# Patient Record
Sex: MALE | Race: WHITE | Employment: OTHER | URBAN - METROPOLITAN AREA
[De-identification: names, ages, dates, MRNs, and addresses within clinical notes are randomized per-mention and may not be internally consistent; named-entity substitution may affect disease eponyms.]

---

## 2017-03-21 PROBLEM — F33.41 RECURRENT MAJOR DEPRESSIVE DISORDER, IN PARTIAL REMISSION (HCC): Status: ACTIVE | Noted: 2017-03-21

## 2017-07-07 ENCOUNTER — HOSPITAL ENCOUNTER (OUTPATIENT)
Age: 82
Setting detail: SPECIMEN
Discharge: HOME | End: 2017-07-07
Payer: COMMERCIAL

## 2017-07-07 LAB
ALBUMIN SERPL-MCNC: 4.1 G/DL (ref 3.5–5.2)
ALBUMIN/GLOBULIN RATIO: 1.1 (ref 1–2.5)
ALP BLD-CCNC: 81 U/L (ref 40–129)
ALT SERPL-CCNC: 23 U/L (ref 5–41)
AST SERPL-CCNC: 23 U/L
BILIRUB SERPL-MCNC: 0.27 MG/DL (ref 0.3–1.2)
BILIRUBIN DIRECT: <0.08 MG/DL
BILIRUBIN, INDIRECT: ABNORMAL MG/DL (ref 0–1)
CHOLESTEROL/HDL RATIO: 3.4
CHOLESTEROL: 212 MG/DL
GLOBULIN: ABNORMAL G/DL (ref 1.5–3.8)
HDLC SERPL-MCNC: 62 MG/DL
LDL CHOLESTEROL: 121 MG/DL (ref 0–130)
TOTAL PROTEIN: 7.8 G/DL (ref 6.4–8.3)
TRIGL SERPL-MCNC: 146 MG/DL
VLDLC SERPL CALC-MCNC: ABNORMAL MG/DL (ref 1–30)

## 2017-07-07 PROCEDURE — 36415 COLL VENOUS BLD VENIPUNCTURE: CPT

## 2017-07-07 PROCEDURE — 80076 HEPATIC FUNCTION PANEL: CPT

## 2017-07-07 PROCEDURE — 80061 LIPID PANEL: CPT

## 2017-07-07 PROCEDURE — P9603 ONE-WAY ALLOW PRORATED MILES: HCPCS

## 2017-08-18 ENCOUNTER — HOSPITAL ENCOUNTER (OUTPATIENT)
Age: 82
Setting detail: SPECIMEN
Discharge: HOME | End: 2017-08-18
Payer: COMMERCIAL

## 2017-08-18 LAB
ABSOLUTE EOS #: 0.3 K/UL (ref 0–0.4)
ABSOLUTE LYMPH #: 1.3 K/UL (ref 1–4.8)
ABSOLUTE MONO #: 0.5 K/UL (ref 0.1–1.2)
ANION GAP SERPL CALCULATED.3IONS-SCNC: 14 MMOL/L (ref 9–17)
BASOPHILS # BLD: 0 %
BASOPHILS ABSOLUTE: 0 K/UL (ref 0–0.2)
BUN BLDV-MCNC: 19 MG/DL (ref 8–23)
BUN/CREAT BLD: ABNORMAL (ref 9–20)
CALCIUM SERPL-MCNC: 9.1 MG/DL (ref 8.6–10.4)
CHLORIDE BLD-SCNC: 100 MMOL/L (ref 98–107)
CHOLESTEROL/HDL RATIO: 3.4
CHOLESTEROL: 183 MG/DL
CO2: 22 MMOL/L (ref 20–31)
CREAT SERPL-MCNC: 1.27 MG/DL (ref 0.7–1.2)
DIFFERENTIAL TYPE: ABNORMAL
EOSINOPHILS RELATIVE PERCENT: 3 %
GFR AFRICAN AMERICAN: >60 ML/MIN
GFR NON-AFRICAN AMERICAN: 54 ML/MIN
GFR SERPL CREATININE-BSD FRML MDRD: ABNORMAL ML/MIN/{1.73_M2}
GFR SERPL CREATININE-BSD FRML MDRD: ABNORMAL ML/MIN/{1.73_M2}
GLUCOSE BLD-MCNC: 89 MG/DL (ref 70–99)
HCT VFR BLD CALC: 39.5 % (ref 41–53)
HDLC SERPL-MCNC: 54 MG/DL
HEMOGLOBIN: 13.1 G/DL (ref 13.5–17.5)
LDL CHOLESTEROL: 102 MG/DL (ref 0–130)
LYMPHOCYTES # BLD: 18 %
MCH RBC QN AUTO: 29.2 PG (ref 26–34)
MCHC RBC AUTO-ENTMCNC: 33.3 G/DL (ref 31–37)
MCV RBC AUTO: 87.6 FL (ref 80–100)
MONOCYTES # BLD: 7 %
PDW BLD-RTO: 14.5 % (ref 12.5–15.4)
PLATELET # BLD: 219 K/UL (ref 140–450)
PLATELET ESTIMATE: ABNORMAL
PMV BLD AUTO: 8.9 FL (ref 6–12)
POTASSIUM SERPL-SCNC: 4.2 MMOL/L (ref 3.7–5.3)
RBC # BLD: 4.5 M/UL (ref 4.5–5.9)
RBC # BLD: ABNORMAL 10*6/UL
SEG NEUTROPHILS: 72 %
SEGMENTED NEUTROPHILS ABSOLUTE COUNT: 5.3 K/UL (ref 1.8–7.7)
SODIUM BLD-SCNC: 136 MMOL/L (ref 135–144)
TRIGL SERPL-MCNC: 134 MG/DL
VITAMIN D 25-HYDROXY: 26.2 NG/ML (ref 30–100)
VLDLC SERPL CALC-MCNC: NORMAL MG/DL (ref 1–30)
WBC # BLD: 7.4 K/UL (ref 3.5–11)
WBC # BLD: ABNORMAL 10*3/UL

## 2017-08-18 PROCEDURE — 82306 VITAMIN D 25 HYDROXY: CPT

## 2017-08-18 PROCEDURE — 36415 COLL VENOUS BLD VENIPUNCTURE: CPT

## 2017-08-18 PROCEDURE — 80048 BASIC METABOLIC PNL TOTAL CA: CPT

## 2017-08-18 PROCEDURE — P9603 ONE-WAY ALLOW PRORATED MILES: HCPCS

## 2017-08-18 PROCEDURE — 80061 LIPID PANEL: CPT

## 2017-08-18 PROCEDURE — 85025 COMPLETE CBC W/AUTO DIFF WBC: CPT

## 2017-09-07 ENCOUNTER — HOSPITAL ENCOUNTER (OUTPATIENT)
Age: 82
Setting detail: SPECIMEN
Discharge: HOME | End: 2017-09-07
Payer: COMMERCIAL

## 2017-09-07 LAB
ANION GAP SERPL CALCULATED.3IONS-SCNC: 16 MMOL/L (ref 9–17)
BUN BLDV-MCNC: 23 MG/DL (ref 8–23)
BUN/CREAT BLD: ABNORMAL (ref 9–20)
CALCIUM SERPL-MCNC: 9.3 MG/DL (ref 8.6–10.4)
CHLORIDE BLD-SCNC: 98 MMOL/L (ref 98–107)
CO2: 25 MMOL/L (ref 20–31)
CREAT SERPL-MCNC: 1.45 MG/DL (ref 0.7–1.2)
GFR AFRICAN AMERICAN: 56 ML/MIN
GFR NON-AFRICAN AMERICAN: 46 ML/MIN
GFR SERPL CREATININE-BSD FRML MDRD: ABNORMAL ML/MIN/{1.73_M2}
GFR SERPL CREATININE-BSD FRML MDRD: ABNORMAL ML/MIN/{1.73_M2}
GLUCOSE BLD-MCNC: 126 MG/DL (ref 70–99)
HCT VFR BLD CALC: 43.6 % (ref 41–53)
HEMOGLOBIN: 14.6 G/DL (ref 13.5–17.5)
MCH RBC QN AUTO: 28.7 PG (ref 26–34)
MCHC RBC AUTO-ENTMCNC: 33.5 G/DL (ref 31–37)
MCV RBC AUTO: 85.8 FL (ref 80–100)
PDW BLD-RTO: 14.2 % (ref 12.5–15.4)
PLATELET # BLD: 249 K/UL (ref 140–450)
PMV BLD AUTO: 9.1 FL (ref 6–12)
POTASSIUM SERPL-SCNC: 4.1 MMOL/L (ref 3.7–5.3)
RBC # BLD: 5.08 M/UL (ref 4.5–5.9)
SODIUM BLD-SCNC: 139 MMOL/L (ref 135–144)
WBC # BLD: 8.8 K/UL (ref 3.5–11)

## 2017-09-07 PROCEDURE — P9603 ONE-WAY ALLOW PRORATED MILES: HCPCS

## 2017-09-07 PROCEDURE — 36415 COLL VENOUS BLD VENIPUNCTURE: CPT

## 2017-09-07 PROCEDURE — 80048 BASIC METABOLIC PNL TOTAL CA: CPT

## 2017-09-07 PROCEDURE — 85027 COMPLETE CBC AUTOMATED: CPT

## 2017-11-06 ENCOUNTER — HOSPITAL ENCOUNTER (OUTPATIENT)
Age: 82
Setting detail: SPECIMEN
Discharge: HOME | End: 2017-11-06
Payer: MEDICAID

## 2017-11-06 LAB
ANION GAP SERPL CALCULATED.3IONS-SCNC: 19 MMOL/L (ref 9–17)
BUN BLDV-MCNC: 14 MG/DL (ref 8–23)
BUN/CREAT BLD: ABNORMAL (ref 9–20)
CALCIUM SERPL-MCNC: 9.3 MG/DL (ref 8.6–10.4)
CHLORIDE BLD-SCNC: 101 MMOL/L (ref 98–107)
CO2: 21 MMOL/L (ref 20–31)
CREAT SERPL-MCNC: 1.36 MG/DL (ref 0.7–1.2)
GFR AFRICAN AMERICAN: >60 ML/MIN
GFR NON-AFRICAN AMERICAN: 50 ML/MIN
GFR SERPL CREATININE-BSD FRML MDRD: ABNORMAL ML/MIN/{1.73_M2}
GFR SERPL CREATININE-BSD FRML MDRD: ABNORMAL ML/MIN/{1.73_M2}
GLUCOSE BLD-MCNC: 155 MG/DL (ref 70–99)
HCT VFR BLD CALC: 45.9 % (ref 40.7–50.3)
HEMOGLOBIN: 14.1 G/DL (ref 13–17)
MCH RBC QN AUTO: 27.4 PG (ref 25.2–33.5)
MCHC RBC AUTO-ENTMCNC: 30.7 G/DL (ref 28.4–34.8)
MCV RBC AUTO: 89.1 FL (ref 82.6–102.9)
PDW BLD-RTO: 14.2 % (ref 11.8–14.4)
PLATELET # BLD: 266 K/UL (ref 138–453)
PMV BLD AUTO: 11.2 FL (ref 8.1–13.5)
POTASSIUM SERPL-SCNC: 4.4 MMOL/L (ref 3.7–5.3)
RBC # BLD: 5.15 M/UL (ref 4.21–5.77)
SODIUM BLD-SCNC: 141 MMOL/L (ref 135–144)
WBC # BLD: 7.2 K/UL (ref 3.5–11.3)

## 2017-11-06 PROCEDURE — 36415 COLL VENOUS BLD VENIPUNCTURE: CPT

## 2017-11-06 PROCEDURE — P9603 ONE-WAY ALLOW PRORATED MILES: HCPCS

## 2017-11-06 PROCEDURE — 85027 COMPLETE CBC AUTOMATED: CPT

## 2017-11-06 PROCEDURE — 80048 BASIC METABOLIC PNL TOTAL CA: CPT

## 2018-01-26 ENCOUNTER — HOSPITAL ENCOUNTER (OUTPATIENT)
Age: 83
Setting detail: SPECIMEN
Discharge: HOME | End: 2018-01-26
Payer: MEDICAID

## 2018-01-26 LAB
-: ABNORMAL
AMORPHOUS: ABNORMAL
BACTERIA: ABNORMAL
BILIRUBIN URINE: NEGATIVE
CASTS UA: ABNORMAL /LPF (ref 0–8)
COLOR: ABNORMAL
COMMENT UA: ABNORMAL
CRYSTALS, UA: ABNORMAL /HPF
EPITHELIAL CELLS UA: ABNORMAL /HPF (ref 0–5)
GLUCOSE URINE: NEGATIVE
KETONES, URINE: NEGATIVE
LEUKOCYTE ESTERASE, URINE: ABNORMAL
MUCUS: ABNORMAL
NITRITE, URINE: NEGATIVE
OTHER OBSERVATIONS UA: ABNORMAL
PH UA: 8.5 (ref 5–8)
PROTEIN UA: ABNORMAL
RBC UA: ABNORMAL /HPF (ref 0–4)
RENAL EPITHELIAL, UA: ABNORMAL /HPF
SPECIFIC GRAVITY UA: 1.01 (ref 1–1.03)
TRICHOMONAS: ABNORMAL
TURBIDITY: ABNORMAL
URINE HGB: ABNORMAL
UROBILINOGEN, URINE: NORMAL
WBC UA: ABNORMAL /HPF (ref 0–5)
YEAST: ABNORMAL

## 2018-01-26 PROCEDURE — 87086 URINE CULTURE/COLONY COUNT: CPT

## 2018-01-26 PROCEDURE — 81001 URINALYSIS AUTO W/SCOPE: CPT

## 2018-01-26 PROCEDURE — 87088 URINE BACTERIA CULTURE: CPT

## 2018-01-26 PROCEDURE — 87186 SC STD MICRODIL/AGAR DIL: CPT

## 2018-01-27 LAB
CULTURE: ABNORMAL
CULTURE: ABNORMAL
Lab: ABNORMAL
Lab: ABNORMAL
ORGANISM: ABNORMAL
SPECIMEN DESCRIPTION: ABNORMAL
SPECIMEN DESCRIPTION: ABNORMAL
STATUS: ABNORMAL

## 2018-02-27 ENCOUNTER — HOSPITAL ENCOUNTER (OUTPATIENT)
Age: 83
Setting detail: SPECIMEN
Discharge: HOME | End: 2018-02-27
Payer: MEDICAID

## 2018-02-27 LAB
ABSOLUTE EOS #: 0.33 K/UL (ref 0–0.44)
ABSOLUTE IMMATURE GRANULOCYTE: <0.03 K/UL (ref 0–0.3)
ABSOLUTE LYMPH #: 1.44 K/UL (ref 1.1–3.7)
ABSOLUTE MONO #: 0.46 K/UL (ref 0.1–1.2)
ANION GAP SERPL CALCULATED.3IONS-SCNC: 14 MMOL/L (ref 9–17)
BASOPHILS # BLD: 1 % (ref 0–2)
BASOPHILS ABSOLUTE: 0.04 K/UL (ref 0–0.2)
BUN BLDV-MCNC: 27 MG/DL (ref 8–23)
BUN/CREAT BLD: ABNORMAL (ref 9–20)
CALCIUM SERPL-MCNC: 9.2 MG/DL (ref 8.6–10.4)
CHLORIDE BLD-SCNC: 95 MMOL/L (ref 98–107)
CO2: 24 MMOL/L (ref 20–31)
CREAT SERPL-MCNC: 1.42 MG/DL (ref 0.7–1.2)
DIFFERENTIAL TYPE: ABNORMAL
EOSINOPHILS RELATIVE PERCENT: 6 % (ref 1–4)
GFR AFRICAN AMERICAN: 58 ML/MIN
GFR NON-AFRICAN AMERICAN: 48 ML/MIN
GFR SERPL CREATININE-BSD FRML MDRD: ABNORMAL ML/MIN/{1.73_M2}
GFR SERPL CREATININE-BSD FRML MDRD: ABNORMAL ML/MIN/{1.73_M2}
GLUCOSE BLD-MCNC: 94 MG/DL (ref 70–99)
HCT VFR BLD CALC: 42.4 % (ref 40.7–50.3)
HEMOGLOBIN: 13.3 G/DL (ref 13–17)
IMMATURE GRANULOCYTES: 0 %
LYMPHOCYTES # BLD: 25 % (ref 24–43)
MAGNESIUM: 2.3 MG/DL (ref 1.6–2.6)
MCH RBC QN AUTO: 28.2 PG (ref 25.2–33.5)
MCHC RBC AUTO-ENTMCNC: 31.4 G/DL (ref 28.4–34.8)
MCV RBC AUTO: 89.8 FL (ref 82.6–102.9)
MONOCYTES # BLD: 8 % (ref 3–12)
NRBC AUTOMATED: 0 PER 100 WBC
PDW BLD-RTO: 13.1 % (ref 11.8–14.4)
PHOSPHORUS: 3.3 MG/DL (ref 2.5–4.5)
PLATELET # BLD: 224 K/UL (ref 138–453)
PLATELET ESTIMATE: ABNORMAL
PMV BLD AUTO: 11.5 FL (ref 8.1–13.5)
POTASSIUM SERPL-SCNC: 4.3 MMOL/L (ref 3.7–5.3)
RBC # BLD: 4.72 M/UL (ref 4.21–5.77)
RBC # BLD: ABNORMAL 10*6/UL
SEG NEUTROPHILS: 60 % (ref 36–65)
SEGMENTED NEUTROPHILS ABSOLUTE COUNT: 3.4 K/UL (ref 1.5–8.1)
SODIUM BLD-SCNC: 133 MMOL/L (ref 135–144)
VITAMIN D 25-HYDROXY: 30.3 NG/ML (ref 30–100)
WBC # BLD: 5.7 K/UL (ref 3.5–11.3)
WBC # BLD: ABNORMAL 10*3/UL

## 2018-02-27 PROCEDURE — 85025 COMPLETE CBC W/AUTO DIFF WBC: CPT

## 2018-02-27 PROCEDURE — 83735 ASSAY OF MAGNESIUM: CPT

## 2018-02-27 PROCEDURE — P9603 ONE-WAY ALLOW PRORATED MILES: HCPCS

## 2018-02-27 PROCEDURE — 36415 COLL VENOUS BLD VENIPUNCTURE: CPT

## 2018-02-27 PROCEDURE — 82306 VITAMIN D 25 HYDROXY: CPT

## 2018-02-27 PROCEDURE — 80048 BASIC METABOLIC PNL TOTAL CA: CPT

## 2018-02-27 PROCEDURE — 84100 ASSAY OF PHOSPHORUS: CPT

## 2018-03-14 ENCOUNTER — HOSPITAL ENCOUNTER (OUTPATIENT)
Age: 83
Setting detail: SPECIMEN
Discharge: HOME | End: 2018-03-14
Payer: MEDICAID

## 2018-03-14 PROCEDURE — 87086 URINE CULTURE/COLONY COUNT: CPT

## 2018-03-15 ENCOUNTER — HOSPITAL ENCOUNTER (OUTPATIENT)
Age: 83
Setting detail: SPECIMEN
Discharge: HOME | End: 2018-03-15
Payer: MEDICAID

## 2018-03-16 LAB
CULTURE: NORMAL
CULTURE: NORMAL
Lab: NORMAL
Lab: NORMAL
SPECIMEN DESCRIPTION: NORMAL
SPECIMEN DESCRIPTION: NORMAL
STATUS: NORMAL

## 2018-05-29 ENCOUNTER — HOSPITAL ENCOUNTER (OUTPATIENT)
Age: 83
Setting detail: SPECIMEN
Discharge: HOME | End: 2018-05-29
Payer: COMMERCIAL

## 2018-05-29 LAB
ANION GAP SERPL CALCULATED.3IONS-SCNC: 18 MMOL/L (ref 9–17)
BUN BLDV-MCNC: 28 MG/DL (ref 8–23)
BUN/CREAT BLD: ABNORMAL (ref 9–20)
CALCIUM SERPL-MCNC: 9.1 MG/DL (ref 8.6–10.4)
CHLORIDE BLD-SCNC: 97 MMOL/L (ref 98–107)
CO2: 24 MMOL/L (ref 20–31)
CREAT SERPL-MCNC: 1.43 MG/DL (ref 0.7–1.2)
GFR AFRICAN AMERICAN: 57 ML/MIN
GFR NON-AFRICAN AMERICAN: 47 ML/MIN
GFR SERPL CREATININE-BSD FRML MDRD: ABNORMAL ML/MIN/{1.73_M2}
GFR SERPL CREATININE-BSD FRML MDRD: ABNORMAL ML/MIN/{1.73_M2}
GLUCOSE BLD-MCNC: 97 MG/DL (ref 70–99)
HCT VFR BLD CALC: 44.1 % (ref 40.7–50.3)
HEMOGLOBIN: 13.7 G/DL (ref 13–17)
MCH RBC QN AUTO: 28.2 PG (ref 25.2–33.5)
MCHC RBC AUTO-ENTMCNC: 31.1 G/DL (ref 28.4–34.8)
MCV RBC AUTO: 90.9 FL (ref 82.6–102.9)
NRBC AUTOMATED: 0 PER 100 WBC
PDW BLD-RTO: 13.6 % (ref 11.8–14.4)
PLATELET # BLD: 238 K/UL (ref 138–453)
PMV BLD AUTO: 11.5 FL (ref 8.1–13.5)
POTASSIUM SERPL-SCNC: 4 MMOL/L (ref 3.7–5.3)
RBC # BLD: 4.85 M/UL (ref 4.21–5.77)
SODIUM BLD-SCNC: 139 MMOL/L (ref 135–144)
WBC # BLD: 7.2 K/UL (ref 3.5–11.3)

## 2018-05-29 PROCEDURE — 85027 COMPLETE CBC AUTOMATED: CPT

## 2018-05-29 PROCEDURE — 80048 BASIC METABOLIC PNL TOTAL CA: CPT

## 2018-05-29 PROCEDURE — P9603 ONE-WAY ALLOW PRORATED MILES: HCPCS

## 2018-05-29 PROCEDURE — 36415 COLL VENOUS BLD VENIPUNCTURE: CPT

## 2019-05-20 ENCOUNTER — APPOINTMENT (OUTPATIENT)
Dept: CT IMAGING | Age: 84
End: 2019-05-20
Payer: COMMERCIAL

## 2019-05-20 ENCOUNTER — HOSPITAL ENCOUNTER (EMERGENCY)
Age: 84
Discharge: HOME | End: 2019-05-20
Attending: EMERGENCY MEDICINE
Payer: COMMERCIAL

## 2019-05-20 VITALS
SYSTOLIC BLOOD PRESSURE: 108 MMHG | TEMPERATURE: 98.6 F | DIASTOLIC BLOOD PRESSURE: 57 MMHG | OXYGEN SATURATION: 100 % | HEIGHT: 66 IN | HEART RATE: 88 BPM | WEIGHT: 233 LBS | RESPIRATION RATE: 16 BRPM | BODY MASS INDEX: 37.45 KG/M2

## 2019-05-20 DIAGNOSIS — G89.29 ACUTE EXACERBATION OF CHRONIC LOW BACK PAIN: Primary | ICD-10-CM

## 2019-05-20 DIAGNOSIS — M54.50 ACUTE EXACERBATION OF CHRONIC LOW BACK PAIN: Primary | ICD-10-CM

## 2019-05-20 LAB
-: ABNORMAL
ABSOLUTE EOS #: 0.3 K/UL (ref 0–0.4)
ABSOLUTE IMMATURE GRANULOCYTE: ABNORMAL K/UL (ref 0–0.3)
ABSOLUTE LYMPH #: 0.9 K/UL (ref 1–4.8)
ABSOLUTE MONO #: 0.5 K/UL (ref 0.1–1.2)
ALBUMIN SERPL-MCNC: 3.5 G/DL (ref 3.5–5.2)
ALBUMIN/GLOBULIN RATIO: 1 (ref 1–2.5)
ALP BLD-CCNC: 62 U/L (ref 40–129)
ALT SERPL-CCNC: 27 U/L (ref 5–41)
AMORPHOUS: ABNORMAL
ANION GAP SERPL CALCULATED.3IONS-SCNC: 13 MMOL/L (ref 9–17)
AST SERPL-CCNC: 20 U/L
BACTERIA: ABNORMAL
BASOPHILS # BLD: 1 % (ref 0–2)
BASOPHILS ABSOLUTE: 0 K/UL (ref 0–0.2)
BILIRUB SERPL-MCNC: 0.36 MG/DL (ref 0.3–1.2)
BILIRUBIN URINE: NEGATIVE
BUN BLDV-MCNC: 16 MG/DL (ref 8–23)
BUN/CREAT BLD: ABNORMAL (ref 9–20)
CALCIUM SERPL-MCNC: 9.5 MG/DL (ref 8.6–10.4)
CASTS UA: ABNORMAL /LPF
CASTS UA: ABNORMAL /LPF
CHLORIDE BLD-SCNC: 101 MMOL/L (ref 98–107)
CO2: 25 MMOL/L (ref 20–31)
COLOR: ABNORMAL
COMMENT UA: ABNORMAL
CREAT SERPL-MCNC: 1.53 MG/DL (ref 0.7–1.2)
CRYSTALS, UA: ABNORMAL /HPF
DIFFERENTIAL TYPE: ABNORMAL
EOSINOPHILS RELATIVE PERCENT: 4 % (ref 1–4)
EPITHELIAL CELLS UA: ABNORMAL /HPF (ref 0–5)
GFR AFRICAN AMERICAN: 53 ML/MIN
GFR NON-AFRICAN AMERICAN: 43 ML/MIN
GFR SERPL CREATININE-BSD FRML MDRD: ABNORMAL ML/MIN/{1.73_M2}
GFR SERPL CREATININE-BSD FRML MDRD: ABNORMAL ML/MIN/{1.73_M2}
GLUCOSE BLD-MCNC: 179 MG/DL (ref 70–99)
GLUCOSE URINE: NEGATIVE
HCT VFR BLD CALC: 39.1 % (ref 41–53)
HEMOGLOBIN: 12.9 G/DL (ref 13.5–17.5)
IMMATURE GRANULOCYTES: ABNORMAL %
KETONES, URINE: NEGATIVE
LEUKOCYTE ESTERASE, URINE: NEGATIVE
LYMPHOCYTES # BLD: 13 % (ref 24–44)
MCH RBC QN AUTO: 29 PG (ref 26–34)
MCHC RBC AUTO-ENTMCNC: 33 G/DL (ref 31–37)
MCV RBC AUTO: 87.8 FL (ref 80–100)
MONOCYTES # BLD: 8 % (ref 2–11)
MUCUS: ABNORMAL
NITRITE, URINE: NEGATIVE
NRBC AUTOMATED: ABNORMAL PER 100 WBC
OTHER OBSERVATIONS UA: ABNORMAL
PDW BLD-RTO: 14.7 % (ref 12.5–15.4)
PH UA: 5 (ref 5–8)
PLATELET # BLD: 231 K/UL (ref 140–450)
PLATELET ESTIMATE: ABNORMAL
PMV BLD AUTO: 9.2 FL (ref 6–12)
POTASSIUM SERPL-SCNC: 4.6 MMOL/L (ref 3.7–5.3)
PROTEIN UA: ABNORMAL
RBC # BLD: 4.45 M/UL (ref 4.5–5.9)
RBC # BLD: ABNORMAL 10*6/UL
RBC UA: ABNORMAL /HPF (ref 0–2)
RENAL EPITHELIAL, UA: ABNORMAL /HPF
SEG NEUTROPHILS: 74 % (ref 36–66)
SEGMENTED NEUTROPHILS ABSOLUTE COUNT: 5.2 K/UL (ref 1.8–7.7)
SODIUM BLD-SCNC: 139 MMOL/L (ref 135–144)
SPECIFIC GRAVITY UA: 1.03 (ref 1–1.03)
TOTAL PROTEIN: 6.9 G/DL (ref 6.4–8.3)
TRICHOMONAS: ABNORMAL
TURBIDITY: CLEAR
URINE HGB: NEGATIVE
UROBILINOGEN, URINE: NORMAL
WBC # BLD: 6.9 K/UL (ref 3.5–11)
WBC # BLD: ABNORMAL 10*3/UL
WBC UA: ABNORMAL /HPF (ref 0–5)
YEAST: ABNORMAL

## 2019-05-20 PROCEDURE — 2580000003 HC RX 258: Performed by: EMERGENCY MEDICINE

## 2019-05-20 PROCEDURE — 96374 THER/PROPH/DIAG INJ IV PUSH: CPT

## 2019-05-20 PROCEDURE — 74176 CT ABD & PELVIS W/O CONTRAST: CPT

## 2019-05-20 PROCEDURE — 96375 TX/PRO/DX INJ NEW DRUG ADDON: CPT

## 2019-05-20 PROCEDURE — 99284 EMERGENCY DEPT VISIT MOD MDM: CPT

## 2019-05-20 PROCEDURE — 80053 COMPREHEN METABOLIC PANEL: CPT

## 2019-05-20 PROCEDURE — 36415 COLL VENOUS BLD VENIPUNCTURE: CPT

## 2019-05-20 PROCEDURE — 6360000002 HC RX W HCPCS: Performed by: EMERGENCY MEDICINE

## 2019-05-20 PROCEDURE — 85025 COMPLETE CBC W/AUTO DIFF WBC: CPT

## 2019-05-20 PROCEDURE — 81001 URINALYSIS AUTO W/SCOPE: CPT

## 2019-05-20 RX ORDER — MORPHINE SULFATE 2 MG/ML
2 INJECTION, SOLUTION INTRAMUSCULAR; INTRAVENOUS ONCE
Status: COMPLETED | OUTPATIENT
Start: 2019-05-20 | End: 2019-05-20

## 2019-05-20 RX ORDER — KETOROLAC TROMETHAMINE 15 MG/ML
15 INJECTION, SOLUTION INTRAMUSCULAR; INTRAVENOUS ONCE
Status: COMPLETED | OUTPATIENT
Start: 2019-05-20 | End: 2019-05-20

## 2019-05-20 RX ORDER — ONDANSETRON 2 MG/ML
4 INJECTION INTRAMUSCULAR; INTRAVENOUS ONCE
Status: COMPLETED | OUTPATIENT
Start: 2019-05-20 | End: 2019-05-20

## 2019-05-20 RX ORDER — 0.9 % SODIUM CHLORIDE 0.9 %
250 INTRAVENOUS SOLUTION INTRAVENOUS ONCE
Status: COMPLETED | OUTPATIENT
Start: 2019-05-20 | End: 2019-05-20

## 2019-05-20 RX ORDER — HYDROCODONE BITARTRATE AND ACETAMINOPHEN 5; 325 MG/1; MG/1
1 TABLET ORAL EVERY 6 HOURS PRN
Qty: 12 TABLET | Refills: 0 | Status: SHIPPED | OUTPATIENT
Start: 2019-05-20 | End: 2019-05-23

## 2019-05-20 RX ADMIN — ONDANSETRON 4 MG: 2 INJECTION INTRAMUSCULAR; INTRAVENOUS at 08:59

## 2019-05-20 RX ADMIN — MORPHINE SULFATE 2 MG: 2 INJECTION, SOLUTION INTRAMUSCULAR; INTRAVENOUS at 09:01

## 2019-05-20 RX ADMIN — SODIUM CHLORIDE 250 ML: 9 INJECTION, SOLUTION INTRAVENOUS at 08:58

## 2019-05-20 RX ADMIN — KETOROLAC TROMETHAMINE 15 MG: 15 INJECTION, SOLUTION INTRAMUSCULAR; INTRAVENOUS at 11:34

## 2019-05-20 ASSESSMENT — PAIN DESCRIPTION - LOCATION
LOCATION: BACK

## 2019-05-20 ASSESSMENT — PAIN DESCRIPTION - DESCRIPTORS
DESCRIPTORS: ACHING

## 2019-05-20 ASSESSMENT — PAIN DESCRIPTION - PAIN TYPE
TYPE: ACUTE PAIN

## 2019-05-20 ASSESSMENT — ENCOUNTER SYMPTOMS
CONSTIPATION: 0
COUGH: 0
BACK PAIN: 1
VOMITING: 1

## 2019-05-20 ASSESSMENT — PAIN SCALES - GENERAL
PAINLEVEL_OUTOF10: 10
PAINLEVEL_OUTOF10: 6
PAINLEVEL_OUTOF10: 7
PAINLEVEL_OUTOF10: 7
PAINLEVEL_OUTOF10: 10

## 2019-05-20 NOTE — ED NOTES
PT to room 6. C/o low back pain onset yesterday am. Pt reports pain has been progressing since onset of s/s. Pt sts he feels he has kidney stones but has no hx of kidney stones. Pt sts he was advised he has renal failure and reports unsure if this is associated with s/s. Pt reports pain is causing nausea and vomiting. Pt sts no dysuria or hematuria, deines any diarrhea fevers. Pt sts he is following with renal doctors at [facility] that he sees every 3 months. Pt alert and oriented speaking sentences no distress noted.       Daniel Riley RN  05/20/19 9268

## 2019-05-20 NOTE — ED PROVIDER NOTES
[facility] ED  [address] [place] [address]  Phone: [phone]  Fax: [phone] X [facility] ED  eMERGENCY dEPARTMENT eNCOUnter      Pt Name: Delfino Swift  MRN: [id number]  [place] 1934  Date of evaluation: 5/20/2019  Provider: Dain Ahumada, [address] [facility]       Chief Complaint   Patient presents with    Back Pain    Emesis         HISTORY OF PRESENT ILLNESS   (Location/Symptom, Timing/Onset,Context/Setting, Quality, Duration, Modifying Factors, Severity)  Note limiting factors. Delfino Swift is a 80 y.o. male who presents to the emergency department For the evaluation of low back pain. Patient states this started a couple of days ago and is on the right greater than the left but he is experiencing on both sides. He did have one episode of vomiting. No diarrhea, does not think he is constipated. Denies pain when urinating. Denies history of recurring back pain or kidney stones. Some Tylenol with some relief. Nursing Notes were reviewed. REVIEW OF SYSTEMS    (2-9systems for level 4, 10 or more for level 5)     Review of Systems   Constitutional: Negative for fever. Respiratory: Negative for cough. Gastrointestinal: Positive for vomiting. Negative for constipation. Genitourinary: Negative for dysuria. Musculoskeletal: Positive for back pain. Skin: Negative for rash. Except asnoted above the remainder of the review of systems was reviewed and negative.        PAST MEDICAL HISTORY     Past Medical History:   Diagnosis Date    Anxiety     Benign prostatic hyperplasia     CAD (coronary artery disease)     CHF (congestive heart failure) (HCC)     Chronic kidney disease     stage 2    [facility] obstructive [facility]     Depression     Hearing loss     Hearing loss     Heart attack ([facility] [address] 2006    5 bypasses    Hemiplegia and hemiparesis following cerebral infarction affecting left non-dominant side ([facility]     Hypertension     Unspecified sleep apnea          SURGICAL HISTORY       Past Surgical History:   Procedure Laterality Date    CATARACT REMOVAL      CORONARY ARTERY BYPASS GRAFT  2006 5 bypass         CURRENT MEDICATIONS     Previous Medications    ACETAMINOPHEN (TYLENOL) 325 MG TABLET    Take 650 mg by mouth every 6 hours as needed for Pain    ALBUTEROL (PROVENTIL) (2.5 MG/3ML) 0.083% NEBULIZER SOLUTION    Take 2.5 mg by nebulization every 6 hours as needed for Wheezing    ALBUTEROL SULFATE  (90 BASE) MCG/ACT INHALER    Inhale 2 puffs into the lungs every 6 hours as needed for Wheezing    ALPRAZOLAM (XANAX) 0.5 MG TABLET    Take 0.5 mg by mouth nightly as needed for Sleep    ASPIRIN 81 MG TABLET    Take 1 tablet by mouth daily    ATORVASTATIN (LIPITOR) 10 MG TABLET    Take 10 mg by mouth daily    BISACODYL (DULCOLAX) 5 MG EC TABLET    Take 10 mg by mouth daily as needed for Constipation    BUMETANIDE (BUMEX) 0.5 MG TABLET    Take 1 tablet by mouth daily    CARVEDILOL (COREG) 12.5 MG TABLET    Take 1 tablet by mouth 2 times daily (with meals)    CLOPIDOGREL (PLAVIX) 75 MG TABLET    Take 1 tablet by mouth daily    DOCUSATE SODIUM (COLACE) 100 MG CAPSULE    Take 1 capsule by mouth daily as needed for Constipation    DULOXETINE (CYMBALTA) 60 MG EXTENDED RELEASE CAPSULE    Take 1 capsule by mouth daily    FLUTICASONE (FLONASE) 50 MCG/ACT NASAL SPRAY    2 sprays by Nasal route daily    HYOSCYAMINE (ANASPAZ;LEVSIN) 125 MCG TABLET    Take 125 mcg by mouth every 4 hours as needed for Cramping    IBUPROFEN (ADVIL;MOTRIN) 600 MG TABLET    Take 600 mg by mouth every 6 hours as needed for Pain    LACTOBACILLUS (BACID) TABS    Take 2 tablets by mouth daily    LORATADINE 10 MG CAPS    Take 10 mg by mouth daily    POLYETHYLENE GLYCOL (GLYCOLAX) POWDER    Take 17 g by mouth 2 times daily    SIMETHICONE (MYLICON) 80 MG CHEWABLE TABLET    Take 80 mg by mouth every 6 hours as needed for 7 for level 4, 8 or more for level 5)     ED Triage Vitals [05/20/19 0835]   BP Temp Temp src Pulse Resp SpO2 Height Weight   -- -- -- 88 16 95 % 5' 6\" (1.676 m) 233 lb (105.7 kg)       Physical Exam   Constitutional: He appears well-developed and well-nourished. No distress. HENT:   Head: Normocephalic and atraumatic. Mouth/Throat: Oropharynx is clear and moist.   Eyes: Conjunctivae are normal.   Pupils are equally round and reacting normally. Extraoccular muscles are grossly intact. Neck: Normal range of motion. No tracheal deviation present. Cardiovascular: Normal rate, regular rhythm, normal heart sounds and intact distal pulses. Exam reveals no friction rub. No murmur heard. Pulmonary/Chest: Effort normal and breath sounds normal. No stridor. No respiratory distress. He has no wheezes. He has no rales. He exhibits no tenderness. Abdominal: Soft. He exhibits no distension and no mass. There is no tenderness. There is no rebound and no guarding. Musculoskeletal: Normal range of motion. He exhibits no edema, tenderness or deformity. Neurological: He is alert. Answering questions appropriately. No gaze deficit. No gait abnormality. Residual Upper and lower extremity deficits from previous stroke   Skin: Skin is warm and dry. Psychiatric: He has a normal mood and affect. Judgment normal.   Nursing note and vitals reviewed. EMERGENCY DEPARTMENT COURSE and DIFFERENTIAL DIAGNOSIS/MDM:   Vitals:    Vitals:    05/20/19 1011 05/20/19 1045 05/20/19 1100 05/20/19 1130   BP:  119/60 (!) 118/46 (!) 108/57   Pulse:       Resp:       SpO2: 100% 100% 100% 100%   Weight:       Height:             Physical exam reveals some findings consistent with possible musculoskeletal low back pain but clinically I cannot rule out underlying things such as kidney stone or acute appendicitis though I have lower suspicion.   I've ordered some routine laboratory testing, urinalysis, CT scan and I'll give a small amount of IV fluids and pain medication. I will then reevaluate. DIAGNOSTIC RESULTS     LABS:  Labs Reviewed   CBC WITH AUTO DIFFERENTIAL - Abnormal; Notable for the following components:       Result Value    RBC 4.45 (*)     Hemoglobin 12.9 (*)     Hematocrit 39.1 (*)     Seg Neutrophils 74 (*)     Lymphocytes 13 (*)     Absolute Lymph # 0.90 (*)     All other components within normal limits   COMPREHENSIVE METABOLIC PANEL - Abnormal; Notable for the following components:    Glucose 179 (*)     CREATININE 1.53 (*)     GFR Non- 43 (*)     GFR  53 (*)     All other components within normal limits   URINE RT REFLEX TO CULTURE - Abnormal; Notable for the following components:    Color, UA DARK YELLOW (*)     Specific Gravity, UA 1.032 (*)     Protein, UA TRACE (*)     All other components within normal limits   MICROSCOPIC URINALYSIS - Abnormal; Notable for the following components:    Bacteria, UA FEW (*)     Mucus, UA 1+ (*)     Amorphous, UA 1+ (*)     Other Observations UA   (*)     Value: Utilizing a urinalysis as the only screening method to exclude a potential uropathogen can be unreliable in many patient populations. Rapid screening tests are less sensitive than culture and if UTI is a clinical possibility, culture should be considered despite a negative urinalysis. All other components within normal limits       All other labs were within normal range or not returned as of this dictation. RADIOLOGY:  CT ABDOMEN PELVIS WO CONTRAST Additional Contrast? None   Preliminary Result   No acute findings. Multilevel degenerative changes. ED Course as of May 20 1147   Mon May 20, 2019   1129 On reevaluation, patient's pain is improved, labs are grossly unremarkable, CT shows some degenerative changes without other significant abnormalities. Just waiting on urine.     [TS]      ED Course User Index  [TS] Felicity Bird, DO     Urine unremarkable, labs unremarkable, CT unremarkable. I suspect his back pain is musculoskeletal and he will Be discharged with appropriate medication and follow-up information    At this time the patient is without objective evidence of an acute process requiring hospitalization or inpatient management. They have remained hemodynamically stable throughout their entire ED visit and are stable for discharge with outpatient follow-up. Standard anticipatory guidance given to patient upon discharge. Have given them a specific time frame in which to follow-up and who to follow-up with. I have also advised them that they should return to the emergency department if they get worse, or not getting better or develop any new or concerning symptoms. Patient demonstrates understanding. PROCEDURES:  Unless otherwise noted below, none     Procedures    FINAL IMPRESSION      1. Acute exacerbation of chronic low back pain          DISPOSITION/PLAN   DISPOSITION Decision To Discharge 05/20/2019 11:44:54 AM      PATIENT REFERRED TO:  Danyel Soriano MD  [address]  [facility] [address]  [phone]    In 3 days        DISCHARGE MEDICATIONS:  New Prescriptions    HYDROCODONE-ACETAMINOPHEN (NORCO) 5-325 MG PER TABLET    Take 1 tablet by mouth every 6 hours as needed for Pain for up to 3 days.           (Please note that portions of this note were completed with a voice recognition program.  Efforts were made to edit the dictations but occasionally words are mis-transcribed.)    Janelle Salmon DO (electronically signed)  Board Certified Emergency Physician    .Narciso Maldonado DO  05/20/19 1145

## 2019-05-20 NOTE — ED NOTES
Pt returns to [facility] via [facility] with belongings     Cathi Dickinson RN  05/20/19 7532

## 2019-05-20 NOTE — ED NOTES
Patient cleared for discharge per MD. Patient discharge instructions explained, Rx given and explained to patient. Patient Verbalized understanding of all instructions and all patient questions answered to their satisfaction.  Pt awaiting transport     Nina Escamilla RN  05/20/19 8215

## 2019-05-22 ENCOUNTER — HOSPITAL ENCOUNTER (INPATIENT)
Age: 84
LOS: 1 days | Discharge: NURSING, REHAB OR LONG-TERM CARE | DRG: 281 | End: 2019-05-23
Attending: SPECIALIST | Admitting: INTERNAL MEDICINE
Payer: COMMERCIAL

## 2019-05-22 ENCOUNTER — APPOINTMENT (OUTPATIENT)
Dept: NUCLEAR MEDICINE | Age: 84
DRG: 281 | End: 2019-05-22
Payer: COMMERCIAL

## 2019-05-22 ENCOUNTER — APPOINTMENT (OUTPATIENT)
Dept: GENERAL RADIOLOGY | Age: 84
DRG: 281 | End: 2019-05-22
Payer: COMMERCIAL

## 2019-05-22 DIAGNOSIS — R77.8 ELEVATED TROPONIN: ICD-10-CM

## 2019-05-22 DIAGNOSIS — R07.9 CHEST PAIN, UNSPECIFIED TYPE: Primary | ICD-10-CM

## 2019-05-22 PROBLEM — I21.4 NON-ST ELEVATION MI (NSTEMI) (HCC): Status: ACTIVE | Noted: 2019-05-22

## 2019-05-22 PROBLEM — M54.50 CHRONIC MIDLINE LOW BACK PAIN WITHOUT SCIATICA: Status: ACTIVE | Noted: 2019-05-22

## 2019-05-22 PROBLEM — I44.0 FIRST DEGREE AV BLOCK: Status: ACTIVE | Noted: 2019-05-22

## 2019-05-22 PROBLEM — I45.10 RIGHT BUNDLE BRANCH BLOCK: Status: ACTIVE | Noted: 2019-05-22

## 2019-05-22 PROBLEM — E66.9 OBESITY (BMI 30-39.9): Status: ACTIVE | Noted: 2019-05-22

## 2019-05-22 PROBLEM — G81.94 LEFT HEMIPARESIS (HCC): Status: ACTIVE | Noted: 2019-05-22

## 2019-05-22 PROBLEM — I21.4 NSTEMI (NON-ST ELEVATED MYOCARDIAL INFARCTION) (HCC): Status: ACTIVE | Noted: 2019-05-22

## 2019-05-22 PROBLEM — G89.29 CHRONIC MIDLINE LOW BACK PAIN WITHOUT SCIATICA: Status: ACTIVE | Noted: 2019-05-22

## 2019-05-22 LAB
ABSOLUTE EOS #: 0.3 K/UL (ref 0–0.4)
ABSOLUTE IMMATURE GRANULOCYTE: ABNORMAL K/UL (ref 0–0.3)
ABSOLUTE LYMPH #: 1.3 K/UL (ref 1–4.8)
ABSOLUTE MONO #: 0.5 K/UL (ref 0.1–1.2)
ANION GAP SERPL CALCULATED.3IONS-SCNC: 12 MMOL/L (ref 9–17)
BASOPHILS # BLD: 1 % (ref 0–2)
BASOPHILS ABSOLUTE: 0 K/UL (ref 0–0.2)
BNP INTERPRETATION: ABNORMAL
BUN BLDV-MCNC: 17 MG/DL (ref 8–23)
BUN/CREAT BLD: ABNORMAL (ref 9–20)
CALCIUM SERPL-MCNC: 9 MG/DL (ref 8.6–10.4)
CHLORIDE BLD-SCNC: 102 MMOL/L (ref 98–107)
CO2: 26 MMOL/L (ref 20–31)
CREAT SERPL-MCNC: 1.41 MG/DL (ref 0.7–1.2)
D-DIMER QUANTITATIVE: 0.58 MG/L FEU
DIFFERENTIAL TYPE: ABNORMAL
EKG ATRIAL RATE: 89 BPM
EKG P AXIS: 73 DEGREES
EKG P-R INTERVAL: 232 MS
EKG Q-T INTERVAL: 414 MS
EKG QRS DURATION: 144 MS
EKG QTC CALCULATION (BAZETT): 503 MS
EKG R AXIS: -141 DEGREES
EKG T AXIS: 60 DEGREES
EKG VENTRICULAR RATE: 89 BPM
EOSINOPHILS RELATIVE PERCENT: 4 % (ref 1–4)
GFR AFRICAN AMERICAN: 58 ML/MIN
GFR NON-AFRICAN AMERICAN: 48 ML/MIN
GFR SERPL CREATININE-BSD FRML MDRD: ABNORMAL ML/MIN/{1.73_M2}
GFR SERPL CREATININE-BSD FRML MDRD: ABNORMAL ML/MIN/{1.73_M2}
GLUCOSE BLD-MCNC: 119 MG/DL (ref 70–99)
HCT VFR BLD CALC: 35.4 % (ref 41–53)
HEMOGLOBIN: 11.7 G/DL (ref 13.5–17.5)
IMMATURE GRANULOCYTES: ABNORMAL %
LYMPHOCYTES # BLD: 18 % (ref 24–44)
MCH RBC QN AUTO: 29.1 PG (ref 26–34)
MCHC RBC AUTO-ENTMCNC: 33.2 G/DL (ref 31–37)
MCV RBC AUTO: 87.6 FL (ref 80–100)
MONOCYTES # BLD: 8 % (ref 2–11)
NRBC AUTOMATED: ABNORMAL PER 100 WBC
PDW BLD-RTO: 14.8 % (ref 12.5–15.4)
PLATELET # BLD: 204 K/UL (ref 140–450)
PLATELET ESTIMATE: ABNORMAL
PMV BLD AUTO: 9.1 FL (ref 6–12)
POTASSIUM SERPL-SCNC: 4.5 MMOL/L (ref 3.7–5.3)
PRO-BNP: 598 PG/ML
RBC # BLD: 4.04 M/UL (ref 4.5–5.9)
RBC # BLD: ABNORMAL 10*6/UL
SEG NEUTROPHILS: 69 % (ref 36–66)
SEGMENTED NEUTROPHILS ABSOLUTE COUNT: 4.7 K/UL (ref 1.8–7.7)
SODIUM BLD-SCNC: 140 MMOL/L (ref 135–144)
TROPONIN INTERP: ABNORMAL
TROPONIN T: ABNORMAL NG/ML
TROPONIN, HIGH SENSITIVITY: 138 NG/L (ref 0–22)
TROPONIN, HIGH SENSITIVITY: 157 NG/L (ref 0–22)
TROPONIN, HIGH SENSITIVITY: 171 NG/L (ref 0–22)
TROPONIN, HIGH SENSITIVITY: 97 NG/L (ref 0–22)
WBC # BLD: 6.8 K/UL (ref 3.5–11)
WBC # BLD: ABNORMAL 10*3/UL

## 2019-05-22 PROCEDURE — 93005 ELECTROCARDIOGRAM TRACING: CPT

## 2019-05-22 PROCEDURE — 96372 THER/PROPH/DIAG INJ SC/IM: CPT

## 2019-05-22 PROCEDURE — 71045 X-RAY EXAM CHEST 1 VIEW: CPT

## 2019-05-22 PROCEDURE — 94640 AIRWAY INHALATION TREATMENT: CPT

## 2019-05-22 PROCEDURE — 3430000000 HC RX DIAGNOSTIC RADIOPHARMACEUTICAL: Performed by: CLINICAL NURSE SPECIALIST

## 2019-05-22 PROCEDURE — 97162 PT EVAL MOD COMPLEX 30 MIN: CPT

## 2019-05-22 PROCEDURE — 2580000003 HC RX 258: Performed by: NURSE PRACTITIONER

## 2019-05-22 PROCEDURE — G0378 HOSPITAL OBSERVATION PER HR: HCPCS

## 2019-05-22 PROCEDURE — 84484 ASSAY OF TROPONIN QUANT: CPT

## 2019-05-22 PROCEDURE — 36415 COLL VENOUS BLD VENIPUNCTURE: CPT

## 2019-05-22 PROCEDURE — 97535 SELF CARE MNGMENT TRAINING: CPT

## 2019-05-22 PROCEDURE — 1200000000 HC SEMI PRIVATE

## 2019-05-22 PROCEDURE — 78582 LUNG VENTILAT&PERFUS IMAGING: CPT

## 2019-05-22 PROCEDURE — APPSS45 APP SPLIT SHARED TIME 31-45 MINUTES: Performed by: CLINICAL NURSE SPECIALIST

## 2019-05-22 PROCEDURE — 6370000000 HC RX 637 (ALT 250 FOR IP): Performed by: NURSE PRACTITIONER

## 2019-05-22 PROCEDURE — 85379 FIBRIN DEGRADATION QUANT: CPT

## 2019-05-22 PROCEDURE — A9540 TC99M MAA: HCPCS | Performed by: CLINICAL NURSE SPECIALIST

## 2019-05-22 PROCEDURE — 2580000003 HC RX 258: Performed by: CLINICAL NURSE SPECIALIST

## 2019-05-22 PROCEDURE — 94760 N-INVAS EAR/PLS OXIMETRY 1: CPT

## 2019-05-22 PROCEDURE — 99285 EMERGENCY DEPT VISIT HI MDM: CPT

## 2019-05-22 PROCEDURE — 83880 ASSAY OF NATRIURETIC PEPTIDE: CPT

## 2019-05-22 PROCEDURE — 99222 1ST HOSP IP/OBS MODERATE 55: CPT | Performed by: INTERNAL MEDICINE

## 2019-05-22 PROCEDURE — 6360000002 HC RX W HCPCS: Performed by: SPECIALIST

## 2019-05-22 PROCEDURE — 97110 THERAPEUTIC EXERCISES: CPT

## 2019-05-22 PROCEDURE — 85025 COMPLETE CBC W/AUTO DIFF WBC: CPT

## 2019-05-22 PROCEDURE — 80048 BASIC METABOLIC PNL TOTAL CA: CPT

## 2019-05-22 PROCEDURE — A9538 TC99M PYROPHOSPHATE: HCPCS | Performed by: CLINICAL NURSE SPECIALIST

## 2019-05-22 PROCEDURE — 94664 DEMO&/EVAL PT USE INHALER: CPT

## 2019-05-22 PROCEDURE — 97166 OT EVAL MOD COMPLEX 45 MIN: CPT

## 2019-05-22 RX ORDER — CARVEDILOL 3.12 MG/1
6.25 TABLET ORAL DAILY
Status: DISCONTINUED | OUTPATIENT
Start: 2019-05-23 | End: 2019-05-23 | Stop reason: HOSPADM

## 2019-05-22 RX ORDER — SODIUM CHLORIDE 0.9 % (FLUSH) 0.9 %
10 SYRINGE (ML) INJECTION EVERY 12 HOURS SCHEDULED
Status: DISCONTINUED | OUTPATIENT
Start: 2019-05-22 | End: 2019-05-23 | Stop reason: HOSPADM

## 2019-05-22 RX ORDER — SODIUM CHLORIDE 0.9 % (FLUSH) 0.9 %
10 SYRINGE (ML) INJECTION PRN
Status: DISCONTINUED | OUTPATIENT
Start: 2019-05-22 | End: 2019-05-23 | Stop reason: HOSPADM

## 2019-05-22 RX ORDER — ONDANSETRON 2 MG/ML
4 INJECTION INTRAMUSCULAR; INTRAVENOUS EVERY 6 HOURS PRN
Status: DISCONTINUED | OUTPATIENT
Start: 2019-05-22 | End: 2019-05-23 | Stop reason: HOSPADM

## 2019-05-22 RX ORDER — SPIRONOLACTONE 25 MG/1
25 TABLET ORAL DAILY
Status: DISCONTINUED | OUTPATIENT
Start: 2019-05-22 | End: 2019-05-23 | Stop reason: HOSPADM

## 2019-05-22 RX ORDER — TRAZODONE HYDROCHLORIDE 50 MG/1
150 TABLET ORAL NIGHTLY
Status: DISCONTINUED | OUTPATIENT
Start: 2019-05-22 | End: 2019-05-23 | Stop reason: HOSPADM

## 2019-05-22 RX ORDER — BUMETANIDE 1 MG/1
1 TABLET ORAL DAILY
Status: DISCONTINUED | OUTPATIENT
Start: 2019-05-22 | End: 2019-05-23 | Stop reason: HOSPADM

## 2019-05-22 RX ORDER — POTASSIUM CHLORIDE 7.45 MG/ML
10 INJECTION INTRAVENOUS PRN
Status: DISCONTINUED | OUTPATIENT
Start: 2019-05-22 | End: 2019-05-23 | Stop reason: HOSPADM

## 2019-05-22 RX ORDER — PREDNISONE 20 MG/1
20 TABLET ORAL DAILY
COMMUNITY

## 2019-05-22 RX ORDER — BUDESONIDE AND FORMOTEROL FUMARATE DIHYDRATE 160; 4.5 UG/1; UG/1
2 AEROSOL RESPIRATORY (INHALATION) 2 TIMES DAILY
COMMUNITY

## 2019-05-22 RX ORDER — POTASSIUM CHLORIDE 20 MEQ/1
40 TABLET, EXTENDED RELEASE ORAL PRN
Status: DISCONTINUED | OUTPATIENT
Start: 2019-05-22 | End: 2019-05-23 | Stop reason: HOSPADM

## 2019-05-22 RX ORDER — ALPRAZOLAM 0.5 MG/1
0.5 TABLET ORAL NIGHTLY PRN
Status: DISCONTINUED | OUTPATIENT
Start: 2019-05-22 | End: 2019-05-23 | Stop reason: HOSPADM

## 2019-05-22 RX ORDER — BISACODYL 10 MG
10 SUPPOSITORY, RECTAL RECTAL DAILY
Status: DISCONTINUED | OUTPATIENT
Start: 2019-05-22 | End: 2019-05-23 | Stop reason: HOSPADM

## 2019-05-22 RX ORDER — HYDROCODONE BITARTRATE AND ACETAMINOPHEN 5; 325 MG/1; MG/1
1 TABLET ORAL EVERY 6 HOURS PRN
Status: DISCONTINUED | OUTPATIENT
Start: 2019-05-22 | End: 2019-05-23 | Stop reason: HOSPADM

## 2019-05-22 RX ORDER — MAGNESIUM SULFATE 1 G/100ML
1 INJECTION INTRAVENOUS PRN
Status: DISCONTINUED | OUTPATIENT
Start: 2019-05-22 | End: 2019-05-23 | Stop reason: HOSPADM

## 2019-05-22 RX ORDER — PREDNISONE 20 MG/1
20 TABLET ORAL DAILY
Status: DISCONTINUED | OUTPATIENT
Start: 2019-05-22 | End: 2019-05-23 | Stop reason: HOSPADM

## 2019-05-22 RX ORDER — ATORVASTATIN CALCIUM 10 MG/1
10 TABLET, FILM COATED ORAL DAILY
Status: DISCONTINUED | OUTPATIENT
Start: 2019-05-22 | End: 2019-05-23 | Stop reason: HOSPADM

## 2019-05-22 RX ORDER — SODIUM CHLORIDE 0.9 % (FLUSH) 0.9 %
10 SYRINGE (ML) INJECTION ONCE
Status: COMPLETED | OUTPATIENT
Start: 2019-05-22 | End: 2019-05-22

## 2019-05-22 RX ORDER — CARVEDILOL 12.5 MG/1
12.5 TABLET ORAL DAILY
Status: DISCONTINUED | OUTPATIENT
Start: 2019-05-22 | End: 2019-05-22

## 2019-05-22 RX ORDER — CLOPIDOGREL BISULFATE 75 MG/1
75 TABLET ORAL DAILY
Status: DISCONTINUED | OUTPATIENT
Start: 2019-05-22 | End: 2019-05-23 | Stop reason: HOSPADM

## 2019-05-22 RX ORDER — NITROGLYCERIN 0.4 MG/1
0.4 TABLET SUBLINGUAL EVERY 5 MIN PRN
Status: DISCONTINUED | OUTPATIENT
Start: 2019-05-22 | End: 2019-05-23 | Stop reason: HOSPADM

## 2019-05-22 RX ORDER — AMOXICILLIN 250 MG
2 CAPSULE ORAL DAILY
COMMUNITY

## 2019-05-22 RX ORDER — BISACODYL 10 MG
10 SUPPOSITORY, RECTAL RECTAL DAILY
COMMUNITY

## 2019-05-22 RX ORDER — SENNA AND DOCUSATE SODIUM 50; 8.6 MG/1; MG/1
2 TABLET, FILM COATED ORAL DAILY
Status: DISCONTINUED | OUTPATIENT
Start: 2019-05-22 | End: 2019-05-23 | Stop reason: HOSPADM

## 2019-05-22 RX ORDER — DOCUSATE SODIUM 100 MG/1
100 CAPSULE, LIQUID FILLED ORAL DAILY PRN
Status: DISCONTINUED | OUTPATIENT
Start: 2019-05-22 | End: 2019-05-23 | Stop reason: HOSPADM

## 2019-05-22 RX ADMIN — ASPIRIN 325 MG: 325 TABLET, DELAYED RELEASE ORAL at 08:08

## 2019-05-22 RX ADMIN — Medication 5.5 MILLICURIE: at 10:30

## 2019-05-22 RX ADMIN — MOMETASONE FUROATE AND FORMOTEROL FUMARATE DIHYDRATE 2 PUFF: 200; 5 AEROSOL RESPIRATORY (INHALATION) at 20:11

## 2019-05-22 RX ADMIN — Medication 10 ML: at 20:54

## 2019-05-22 RX ADMIN — ATORVASTATIN CALCIUM 10 MG: 10 TABLET, FILM COATED ORAL at 08:08

## 2019-05-22 RX ADMIN — TRAZODONE HYDROCHLORIDE 150 MG: 50 TABLET ORAL at 20:54

## 2019-05-22 RX ADMIN — ENOXAPARIN SODIUM 100 MG: 100 INJECTION SUBCUTANEOUS at 04:26

## 2019-05-22 RX ADMIN — SPIRONOLACTONE 25 MG: 25 TABLET, FILM COATED ORAL at 08:08

## 2019-05-22 RX ADMIN — MOMETASONE FUROATE AND FORMOTEROL FUMARATE DIHYDRATE 2 PUFF: 200; 5 AEROSOL RESPIRATORY (INHALATION) at 08:20

## 2019-05-22 RX ADMIN — HYDROCODONE BITARTRATE AND ACETAMINOPHEN 1 TABLET: 5; 325 TABLET ORAL at 08:03

## 2019-05-22 RX ADMIN — SENNOSIDES, DOCUSATE SODIUM 2 TABLET: 50; 8.6 TABLET, FILM COATED ORAL at 08:08

## 2019-05-22 RX ADMIN — PREDNISONE 20 MG: 20 TABLET ORAL at 12:47

## 2019-05-22 RX ADMIN — HYDROCODONE BITARTRATE AND ACETAMINOPHEN 1 TABLET: 5; 325 TABLET ORAL at 22:54

## 2019-05-22 RX ADMIN — BUMETANIDE 1 MG: 1 TABLET ORAL at 08:08

## 2019-05-22 RX ADMIN — Medication 33.8 MILLICURIE: at 10:17

## 2019-05-22 RX ADMIN — Medication 10 ML: at 10:18

## 2019-05-22 RX ADMIN — HYDROCODONE BITARTRATE AND ACETAMINOPHEN 1 TABLET: 5; 325 TABLET ORAL at 15:10

## 2019-05-22 RX ADMIN — ALPRAZOLAM 0.5 MG: 0.5 TABLET ORAL at 20:54

## 2019-05-22 RX ADMIN — CLOPIDOGREL BISULFATE 75 MG: 75 TABLET ORAL at 08:08

## 2019-05-22 RX ADMIN — Medication 10 ML: at 08:09

## 2019-05-22 ASSESSMENT — PAIN DESCRIPTION - LOCATION
LOCATION: BACK

## 2019-05-22 ASSESSMENT — PAIN DESCRIPTION - PAIN TYPE
TYPE: CHRONIC PAIN

## 2019-05-22 ASSESSMENT — PAIN SCALES - GENERAL
PAINLEVEL_OUTOF10: 0
PAINLEVEL_OUTOF10: 4
PAINLEVEL_OUTOF10: 6
PAINLEVEL_OUTOF10: 5
PAINLEVEL_OUTOF10: 0
PAINLEVEL_OUTOF10: 7
PAINLEVEL_OUTOF10: 3
PAINLEVEL_OUTOF10: 4
PAINLEVEL_OUTOF10: 4
PAINLEVEL_OUTOF10: 0

## 2019-05-22 ASSESSMENT — PAIN - FUNCTIONAL ASSESSMENT
PAIN_FUNCTIONAL_ASSESSMENT: ACTIVITIES ARE NOT PREVENTED
PAIN_FUNCTIONAL_ASSESSMENT: ACTIVITIES ARE NOT PREVENTED

## 2019-05-22 ASSESSMENT — PAIN DESCRIPTION - FREQUENCY: FREQUENCY: CONTINUOUS

## 2019-05-22 ASSESSMENT — PAIN DESCRIPTION - DESCRIPTORS
DESCRIPTORS: ACHING;DISCOMFORT
DESCRIPTORS: ACHING;CONSTANT

## 2019-05-22 ASSESSMENT — PAIN DESCRIPTION - PROGRESSION
CLINICAL_PROGRESSION: NOT CHANGED
CLINICAL_PROGRESSION: RESOLVED
CLINICAL_PROGRESSION: GRADUALLY IMPROVING

## 2019-05-22 ASSESSMENT — ENCOUNTER SYMPTOMS
ABDOMINAL PAIN: 0
SHORTNESS OF BREATH: 0
NAUSEA: 0
COUGH: 0

## 2019-05-22 ASSESSMENT — PAIN DESCRIPTION - ONSET: ONSET: ON-GOING

## 2019-05-22 ASSESSMENT — PAIN DESCRIPTION - ORIENTATION
ORIENTATION: LOWER

## 2019-05-22 NOTE — PROGRESS NOTES
discontinued)  Type of Home: Facility  Home Layout: One level  Home Access: Level entry  Bathroom Shower/Tub: Walk-in shower  Bathroom Toilet: Standard  Bathroom Equipment: Grab bars in shower, Shower chair, Grab bars around toilet  Bathroom Accessibility: Accessible  Home Equipment: Hospital bed, Wheelchair-electric  Receives Help From: Personal care attendant  ADL Assistance: Needs assistance  Homemaking Assistance: Needs assistance  Homemaking Responsibilities: No  Ambulation Assistance: Needs assistance  Transfer Assistance: Needs assistance  Active : No  Patient's  Info: facility or family provide transportation  Occupation: Retired  Leisure & Hobbies: Enjoys cooking  Cognition        Objective  Note pt with dyspnea on exertion with all mobility          AROM RLE (degrees)  RLE AROM: WFL  PROM LLE (degrees)  LLE PROM: Exceptions  L Hip Flexion 0-125: ~0-90  L Hip ADduction 0-10: to neutral with passive stretch  L Hip Internal Rotation 0-45: to neutral with passive stretch  L Knee Flexion 0-145: ~ 0-100  L Ankle Dorsiflexion 0-20: to neutral with stretch  AROM LLE (degrees)  LLE AROM : (hemiparesis- LLE stiffness with pt laying in GABE)  AROM RUE (degrees)  RUE AROM : WNL  PROM LUE (degrees)  LUE PROM: Exceptions  L Shoulder Flex  0-170: ~0-90 with passive stretch  L Elbow Flex/Ext 0-145: `0-90 with prolonged stretch  L Forearm Sup  0-90: ~ 0-30  L Wrist Flex 0-80: ~ 0-20  L Wrist Ext 0-70: ~0-10  Left Hand PROM (degrees)  Left Hand PROM: Exceptions  Left Hand General PROM: see OT   Strength RLE  Strength RLE: WFL  Strength LLE  Strength LLE: Exception  L Hip Flexion: 2+/5  L Hip Extension: 2+/5  L Hip ABduction: 2-/5  L Hip ADduction: 2+/5  L Knee Extension: 3-/5  L Ankle Dorsiflexion: 2/5  L Ankle Plantar Flexion: 1+/5  Strength RUE  Strength RUE: WNL  Strength LUE  Comment: see OT: minimal active movement  Tone RLE  RLE Tone: Normotonic  Tone LLE  LLE Tone: Hypertonic;Clonus  Tone Description: flexor and extensor tone with rigidity at hip and knee, and throughout LUE     Bed mobility  Rolling to Right: Moderate assistance  Supine to Sit: Moderate assistance  Scooting: Moderate assistance  Comment: toward intact side  Transfers  Sit to Stand: Moderate Assistance(x2 assist, pt bears weight on LLE with weight shifted to RLE)  Stand to sit: Minimal Assistance(x 2 assist)  Bed to Chair: Moderate assistance(x2 assist stand pivot)  Stand Pivot Transfers: 2 Person Assistance  Ambulation  Ambulation?: No     Balance  Posture: Fair  Sitting - Static: Good  Sitting - Dynamic: Fair;+  Standing - Static: Poor(with  2 assist)  Standing - Dynamic: Poor;-(2 assist)  Exercises  Comments: L ankle PROM /stretch x5, AAROM hip and knee flex/ext x5     Plan   Plan  Times per week: 4-5  Specific instructions for Next Treatment: standing balance in leo pulido. Current Treatment Recommendations: Endurance Training, Strengthening, Transfer Training, Balance Training, Functional Mobility Training  Safety Devices  Type of devices: Left in chair, Chair alarm in place, Call light within reach, Nurse notified, Patient at risk for falls, Gait belt    G-Code       OutComes Score                                                  AM-PAC Score 11/24             Goals  Short term goals  Time Frame for Short term goals: 14 visits  Short term goal 1: Moderate assist x1 assist for bed mobility  Short term goal 2: sit or stand pivot transfer with 1 moderate assist bed to chair  Short term goal 3: Stand with leo stedy or 2 assist with equal WB through LEs x2-3 minutes and 1 mod assist  Short term goal 4: Increase L ankle ROM to ~ 2-3 deg Dorsiflexion PROM  Patient Goals   Patient goals : \" get back to the way I was a few weeks ago. \"       Therapy Time   Individual Concurrent Group Co-treatment   Time In 1504         Time Out 1233         Minutes Juan 32, PT

## 2019-05-22 NOTE — ED PROVIDER NOTES
[facility]  eMERGENCY dEPARTMENT eNCOUnter      Pt Name: Beny James  MRN: [id number]  [place] 1934  Date of evaluation: 5/22/19      CHIEF COMPLAINT       Chief Complaint   Patient presents with    Chest Pain     PAtient was trans[facility]. EMS gave 1 SL ntg and 324mg ASA and arrivied with IV to right wrist (Inflitrated and discontinued). Currently denies chest pain. HISTORY OF PRESENT ILLNESS    Beny James is a 80 y.o. male who presents to the emergency department brought in via EMS transferred from [address] [place] [address] patient has been having intermittent substernal chest pain nonradiating in nature since 3 p.m. yesterday afternoon. Patient has been dull aching in nature 7 out of 10 in intensity. Patient denies any nausea, shortness of breath, lightheadedness, dizziness, palpitations or diaphoresis. He also denies any swelling in the legs or calf pain. No history of cough, fever or chills. Chest pain has been lasting maximum 5 minutes at a time. Patient was given one sublingual nitroglycerin with the relief of the pain. He was also given aspirin 325 mg orally prior to coming to the emergency department. Upon arrival patient is chest pain-free. Patient has history of corneal artery disease and congestive heart failure and has undergone the coronary artery bypass graft surgery of 5 vessels in 2006. He has not had any recent stress test.  His code status is [facility]. REVIEW OF SYSTEMS       Review of Systems   Constitutional: Negative for diaphoresis and fatigue. Respiratory: Negative for cough and shortness of breath. Cardiovascular: Positive for chest pain. Negative for palpitations and leg swelling. Gastrointestinal: Negative for abdominal pain and nausea. Neurological: Negative for dizziness and light-headedness. All other systems reviewed and are negative.        PAST MEDICAL HISTORY    has a past medical history of Acute hypokalemia, Anxiety, Benign prostate hyperplasia, Benign prostatic hyperplasia, CAD (coronary artery disease), CHF (congestive heart failure) ([address] Chronic kidney disease, Chronic renal disease, stage II, Cognitive communication deficit, COPD (chronic obstructive pulmonary disease) ([address] Coronary atherosclerosis of native coronary artery, Depression, Dysphagia, General weakness, Hearing loss, Hearing loss, Heart attack ([address] Heart failure, unspecified ([address] Hemiplegia and hemiparesis following cerebral infarction affecting left non-dominant side ([address] Hemiplegia and hemiparesis following unspecified cerebrovascular disease affecting left non-dominant side ([address] Hyperlipidemia, Hypertension, Insomnia, Repeated falls, and Unspecified sleep apnea.     SURGICAL HISTORY      has a past surgical history that includes Coronary artery bypass graft (2006) and Cataract removal.    CURRENT MEDICATIONS       Previous Medications    ACETAMINOPHEN (TYLENOL) 325 MG TABLET    Take 650 mg by mouth every 6 hours as needed for Pain    ALBUTEROL SULFATE  (90 BASE) MCG/ACT INHALER    Inhale 2 puffs into the lungs every 6 hours as needed for Wheezing    ALPRAZOLAM (XANAX) 0.5 MG TABLET    Take 0.5 mg by mouth nightly as needed for Sleep    ASPIRIN 81 MG TABLET    Take 1 tablet by mouth daily    ATORVASTATIN (LIPITOR) 10 MG TABLET    Take 10 mg by mouth daily    BISACODYL (DULCOLAX) 10 MG SUPPOSITORY    Place 10 mg rectally daily    BISACODYL (DULCOLAX) 5 MG EC TABLET    Take 10 mg by mouth daily as needed for Constipation    BUDESONIDE-FORMOTEROL (SYMBICORT) 160-4.5 MCG/ACT AERO    Inhale 2 puffs into the lungs 2 times daily    BUMETANIDE (BUMEX) 0.5 MG TABLET    Take 1 tablet by mouth daily    CARVEDILOL (COREG) 12.5 MG TABLET    Take 1 tablet by mouth 2 times daily (with meals)    CLOPIDOGREL (PLAVIX) 75 MG TABLET    Take 1 tablet by mouth daily    DOCUSATE SODIUM (COLACE) 100 MG CAPSULE    Take 1 capsule by mouth daily as needed for Constipation    HYDROCODONE-ACETAMINOPHEN (NORCO) 5-325 MG PER TABLET    Take 1 tablet by mouth every 6 hours as needed for Pain for up to 3 days. PREDNISONE (DELTASONE) 20 MG TABLET    Take 20 mg by mouth daily    SENNA-DOCUSATE (PERICOLACE) 8.6-50 MG PER TABLET    Take 2 tablets by mouth daily    SPIRONOLACTONE (ALDACTONE) 25 MG TABLET    Take 1 tablet by mouth 2 times daily    TRAZODONE (DESYREL) 50 MG TABLET    Take 1 tablet by mouth nightly       ALLERGIES     has No Known Allergies. FAMILY HISTORY     indicated that his mother is . He indicated that his father is . He indicated that his brother is . He indicated that his maternal grandfather is . family history includes Cancer in his brother; Diabetes in his maternal grandfather. SOCIAL HISTORY      reports that he has quit smoking. His smoking use included cigars. He has never used smokeless tobacco. He reports that he drinks alcohol. He reports that he does not use drugs. PHYSICAL EXAM     INITIAL VITALS:  height is 5' 6\" (1.676 m) and weight is 105.7 kg (233 lb). His oral temperature is 97.5 °F (36.4 °C). His blood pressure is 104/58 (abnormal) and his pulse is 76. His respiration is 14 and oxygen saturation is 100%. Physical Exam   Constitutional: He is oriented to person, place, and time. He appears well-developed and well-nourished. HENT:   Head: Normocephalic and atraumatic. Nose: Nose normal.   Mouth/Throat: Oropharynx is clear and moist.   Eyes: Pupils are equal, round, and reactive to light. EOM are normal.   Neck: Normal range of motion. Neck supple. Cardiovascular: Normal rate, regular rhythm, normal heart sounds and intact distal pulses. No murmur heard. Pulmonary/Chest: Effort normal and breath sounds normal. No respiratory distress. Abdominal: Soft. Bowel sounds are normal. He exhibits no distension. There is no tenderness.    Neurological: He is alert and oriented to person, place, and time. Residual left sided weakness from his prior stroke   Skin: Skin is warm and dry. Nursing note and vitals reviewed. DIFFERENTIAL DIAGNOSIS/ MDM:     Bronchitis, Pneumonia, ACS, PE, Musculoskeletal pain, pneumothorax, thoracic aortic dissection, nonspecific chest pain, gastrointestinal in origin    DIAGNOSTIC RESULTS     EKG: All EKG's are interpreted by the Emergency Department Physician who either signs or Co-signs this chart in the absence of a cardiologist.    EKG Interpretation    Interpreted by emergency department physician    Rhythm: normal sinus   Rate: normal  Axis: normal  Ectopy: none  Conduction: right bundle branch block (complete) and 1st degree AV block  ST Segments: no acute change  T Waves: no acute change  Q Waves: none    Clinical Impression: non-specific EKG, right bundle branch block and 1st degree AV block    Faheem Bridges    RADIOLOGY:   Non-plain film images such as CT, Ultrasound and MRI are read by the radiologist. Tanya Menchaca radiographic images are visualized and the radiologist interpretations are reviewed as follows:     Ct Abdomen Pelvis Wo Contrast Additional Contrast? None    Result Date: 5/21/2019  EXAMINATION: CT OF THE ABDOMEN AND PELVIS WITHOUT CONTRAST 5/20/2019 9:44 am TECHNIQUE: CT of the abdomen and pelvis was performed without the administration of intravenous contrast. Multiplanar reformatted images are provided for review. Dose modulation, iterative reconstruction, and/or weight based adjustment of the mA/kV was utilized to reduce the radiation dose to as low as reasonably achievable. COMPARISON: None. HISTORY: ORDERING SYSTEM PROVIDED HISTORY: right flank pain TECHNOLOGIST PROVIDED HISTORY: Ordering Physician Provided Reason for Exam: complains of bilateral low back pain x 1 day with nausea and vomiting.  Acuity: Acute Type of Exam: Initial Additional signs and symptoms: Nausea, vomiting Relevant Medical/Surgical History: Hx of Stage 3 CKD, CHF, CAD and hx of CABG FINDINGS: Lower Chest: Clear lung bases aside from dependent changes. Heavy coronary calcifications. Nonenlarged heart. Organs: Solid organs are limited without contrast.  No focal hepatic lesion. Spleen, pancreas, gallbladder, and adrenal glands are normal.  No acute process of either kidney. GI/Bowel: Normal appendix. Stool throughout the colon. No dilated bowel. Normal duodenal C-loop. Underdistended stomach. Pelvis: Enlarged prostate. Diffusely thickened bladder wall may relate to underdistention, sequela of chronic outlet obstruction, or cystitis. Peritoneum/Retroperitoneum: Prominence of the intima of the aorta implies underlying anemia. Atherosclerotic changes of aorta. No adenopathy. No free fluid. Bones/Soft Tissues: Diffuse multilevel degenerative changes. No acute osseous abnormality. No aggressive osseous lesions. No acute findings. Multilevel degenerative changes. Xr Chest Portable    Result Date: 5/22/2019  EXAMINATION: ONE XRAY VIEW OF THE CHEST 5/22/2019 2:19 am COMPARISON: 05/12/2013. HISTORY: ORDERING SYSTEM PROVIDED HISTORY: chest pain TECHNOLOGIST PROVIDED HISTORY: chest pain Ordering Physician Provided Reason for Exam: chst pain Acuity: Acute Type of Exam: Initial FINDINGS: Median sternotomy wires are noted from CABG. The cardiac silhouette and mediastinal contours are stable. Lung volumes are low, unchanged. No focal lung infiltrate. No pleural effusion or pneumothorax. The visualized osseous structures are unremarkable. 1. Low lung volumes, stable.      LABS:  Results for orders placed or performed during the hospital encounter of 05/22/19   CBC Auto Differential   Result Value Ref Range    WBC 6.8 3.5 - 11.0 k/uL    RBC 4.04 (L) 4.5 - 5.9 m/uL    Hemoglobin 11.7 (L) 13.5 - 17.5 g/dL    Hematocrit 35.4 (L) 41 - 53 %    MCV 87.6 80 - 100 fL    MCH 29.1 26 - 34 pg    MCHC 33.2 31 - 37 g/dL    RDW 14.8 12.5 - 15.4 %    Platelets 929 140 - 450 k/uL    MPV 9.1 6.0 - 12.0 fL    NRBC Automated NOT REPORTED per 100 WBC    Differential Type NOT REPORTED     Seg Neutrophils 69 (H) 36 - 66 %    Lymphocytes 18 (L) 24 - 44 %    Monocytes 8 2 - 11 %    Eosinophils % 4 1 - 4 %    Basophils 1 0 - 2 %    Immature Granulocytes NOT REPORTED 0 %    Segs Absolute 4.70 1.8 - 7.7 k/uL    Absolute Lymph # 1.30 1.0 - 4.8 k/uL    Absolute Mono # 0.50 0.1 - 1.2 k/uL    Absolute Eos # 0.30 0.0 - 0.4 k/uL    Basophils # 0.00 0.0 - 0.2 k/uL    Absolute Immature Granulocyte NOT REPORTED 0.00 - 0.30 k/uL    WBC Morphology NOT REPORTED     RBC Morphology NOT REPORTED     Platelet Estimate NOT REPORTED    Basic Metabolic Panel w/ Reflex to MG   Result Value Ref Range    Glucose 119 (H) 70 - 99 mg/dL    BUN 17 8 - 23 mg/dL    CREATININE 1.41 (H) 0.70 - 1.20 mg/dL    Bun/Cre Ratio NOT REPORTED 9 - 20    Calcium 9.0 8.6 - 10.4 mg/dL    Sodium 140 135 - 144 mmol/L    Potassium 4.5 3.7 - 5.3 mmol/L    Chloride 102 98 - 107 mmol/L    CO2 26 20 - 31 mmol/L    Anion Gap 12 9 - 17 mmol/L    GFR Non-African American 48 (L) >60 mL/min    GFR  58 (L) >60 mL/min    GFR Comment          GFR Staging NOT REPORTED    Troponin   Result Value Ref Range    Troponin, High Sensitivity 97 (HH) 0 - 22 ng/L    Troponin T NOT REPORTED <0.03 ng/mL    Troponin Interp NOT REPORTED    D-Dimer, Quantitative   Result Value Ref Range    D-Dimer, Quant 0.58 mg/L FEU   Brain Natriuretic Peptide   Result Value Ref Range    Pro- (H) <300 pg/mL    BNP Interpretation Pro-BNP Reference Range:        I reviewed all the lab results and the patient's troponin is elevated and d-dimer is 0.58. He also has elevated creatinine level which is around his baseline.     EMERGENCY DEPARTMENT COURSE:   Vitals:    Vitals:    05/22/19 0245 05/22/19 0300 05/22/19 0315 05/22/19 0330   BP: (!) 102/58 (!) 98/59 (!) 102/57 (!) 104/58   Pulse: 77 75 79 76   Resp: 19 15 15 14   Temp:       TempSrc:       SpO2:

## 2019-05-22 NOTE — CONSULTS
[facility]   Consult Note                 Date:   5/22/2019  Date of admission:  5/22/2019  2:01 AM  MRN:   [id number]  YOB: 1934    Reason for Consult:  Chest pain     HISTORY OF PRESENT ILLNESS:    The patient is a 80 y.o.  male who is admitted to the hospital for chest pain back pain , although he denies any chest pain now . Had extensive h/o CAD , CABG had fired his cardiologist for long time and not seen any   Had CVA , lives in Nursing home . Past Medical History:   has a past medical history of Acute hypokalemia, Anxiety, Benign prostate hyperplasia, Benign prostatic hyperplasia, CAD (coronary artery disease), CHF (congestive heart failure) ([address] Chronic kidney disease, Chronic renal disease, stage II, Cognitive communication deficit, COPD (chronic obstructive pulmonary disease) ([address] Coronary atherosclerosis of native coronary artery, Depression, Dysphagia, General weakness, Hearing loss, Hearing loss, Heart attack ([address] Heart failure, unspecified ([address] Hemiplegia and hemiparesis following cerebral infarction affecting left non-dominant side ([address] Hemiplegia and hemiparesis following unspecified cerebrovascular disease affecting left non-dominant side ([address] Hyperlipidemia, Hypertension, Insomnia, Repeated falls, and Unspecified sleep apnea. Past Surgical History:   has a past surgical history that includes Coronary artery bypass graft (2006) and Cataract removal.     Home Medications:    Prior to Admission medications    Medication Sig Start Date End Date Taking?  Authorizing Provider   bisacodyl (DULCOLAX) 10 MG suppository Place 10 mg rectally daily   Yes Historical Provider, MD   predniSONE (DELTASONE) 20 MG tablet Take 20 mg by mouth daily   Yes Historical Provider, MD   senna-docusate (PERICOLACE) 8.6-50 MG per tablet Take 2 tablets by mouth daily   Yes Historical Provider, MD   budesonide-formoterol (SYMBICORT) 160-4.5 MCG/ACT AERO Inhale 2 puffs into the lungs 2 times daily   Yes Historical Provider, MD   HYDROcodone-acetaminophen (NORCO) 5-325 MG per tablet Take 1 tablet by mouth every 6 hours as needed for Pain for up to 3 days.  5/20/19 5/23/19 Yes Espiridion Medico, DO   acetaminophen (TYLENOL) 325 MG tablet Take 650 mg by mouth every 6 hours as needed for Pain   Yes Historical Provider, MD   bisacodyl (DULCOLAX) 5 MG EC tablet Take 10 mg by mouth daily as needed for Constipation   Yes Historical Provider, MD   ALPRAZolam Sue Rob) 0.5 MG tablet Take 0.5 mg by mouth nightly as needed for Sleep   Yes Historical Provider, MD   bumetanide (BUMEX) 0.5 MG tablet Take 1 tablet by mouth daily  Patient taking differently: Take 1 mg by mouth daily  8/11/17  Yes Floyd Oconnor MD   atorvastatin (LIPITOR) 10 MG tablet Take 10 mg by mouth daily   Yes Historical Provider, MD   albuterol sulfate  (90 BASE) MCG/ACT inhaler Inhale 2 puffs into the lungs every 6 hours as needed for Wheezing 2/19/16  Yes Floyd Oconnor MD   aspirin 81 MG tablet Take 1 tablet by mouth daily 2/19/16  Yes Floyd Oconnor MD   carvedilol (COREG) 12.5 MG tablet Take 1 tablet by mouth 2 times daily (with meals)  Patient taking differently: Take 12.5 mg by mouth daily  2/19/16  Yes Floyd Oconnor MD   clopidogrel (PLAVIX) 75 MG tablet Take 1 tablet by mouth daily 2/19/16  Yes Floyd Oconnor MD   docusate sodium (COLACE) 100 MG capsule Take 1 capsule by mouth daily as needed for Constipation 2/19/16  Yes Floyd Oconnor MD   spironolactone (ALDACTONE) 25 MG tablet Take 1 tablet by mouth 2 times daily  Patient taking differently: Take 25 mg by mouth daily  2/19/16  Yes Floyd Oconnor MD   traZODone (DESYREL) 50 MG tablet Take 1 tablet by mouth nightly  Patient taking differently: Take 150 mg by mouth nightly  2/19/16  Yes Floyd Oconnor MD       Current Medications: Scheduled Meds:   atorvastatin  10 mg Oral Daily    bisacodyl  10 mg Rectal Daily    mometasone-formoterol  2 puff Inhalation BID    bumetanide  1 mg Oral Daily    clopidogrel  75 mg Oral Daily    predniSONE  20 mg Oral Daily    sennosides-docusate sodium  2 tablet Oral Daily    spironolactone  25 mg Oral Daily    traZODone  150 mg Oral Nightly    sodium chloride flush  10 mL Intravenous 2 times per day    aspirin  325 mg Oral Daily    enoxaparin  40 mg Subcutaneous Daily    [START ON 5/23/2019] carvedilol  6.25 mg Oral Daily     Continuous Infusions:  PRN Meds:. ALPRAZolam, docusate sodium, HYDROcodone-acetaminophen, sodium chloride flush, potassium chloride **OR** potassium alternative oral replacement **OR** potassium chloride, potassium chloride, magnesium sulfate, magnesium hydroxide, ondansetron, nitroGLYCERIN     Allergies:  Patient has no known allergies. Social History:   reports that he has quit smoking. His smoking use included cigars. He has never used smokeless tobacco. He reports that he drinks alcohol. He reports that he does not use drugs. Family History: family history includes Cancer in his brother; Diabetes in his maternal grandfather. Review of Systems   CONSTITUTIONAL:  negative for fevers, chills, fatigue and malaise    EYES:  negative for discharge    HEENT:  negative for epistaxis and sore throat    RESPIRATORY:  negative for cough, shortness of breath, wheezing    CARDIOVASCULAR:  As above  chest pain, palpitations, syncope, edema    GASTROINTESTINAL:  negative for nausea, vomiting, diarrhea, constipation, abdominal pain    GENITOURINARY:  negative for incontinence    MUSCULOSKELETAL:  negative for neck or back pain    NEUROLOGICAL:  Old CVA    PSYCHIATRIC:  negative               PHYSICAL EXAM:    Blood pressure (!) 99/58, pulse 74, temperature 97.4 °F (36.3 °C), temperature source Oral, resp. rate 18, height 5' 6\" (1.676 m), weight 233 lb 14.5 oz (106.1 kg), SpO2 92 %.     CONSTITUTIONAL: AOx4, no apparent distress,   HEAD: normocephalic, atraumatic EYES: PERRLA, EOMI   ENT: moist mucous membranes, uvula midline   NECK:  symmetric, no midline tenderness to palpation   LUNGS: reduced  to auscultation bilaterally   CARDIOVASCULAR: regular rate and rhythm, no murmurs, rubs or gallops   ABDOMEN: Soft, non-tender, non-distended with normal active bowel sounds   SKIN: no rash       DATA:    ECG:  Echo:  Stress:  Cath:    Labs:     CBC:   Recent Labs     05/20/19  0922 05/22/19  0208   WBC 6.9 6.8   HGB 12.9* 11.7*   HCT 39.1* 35.4*    204     BMP:   Recent Labs     05/20/19  0922 05/22/19  0208    140   K 4.6 4.5   CO2 25 26   BUN 16 17   CREATININE 1.53* 1.41*   LABGLOM 43* 48*   GLUCOSE 179* 119*     BNP: No results for input(s): BNP in the last 72 hours. PT/INR: No results for input(s): PROTIME, INR in the last 72 hours. APTT:No results for input(s): APTT in the last 72 hours. CARDIAC ENZYMES:No results for input(s): CKTOTAL, CKMB, CKMBINDEX, TROPONINI in the last 72 hours.   FASTING LIPID PANEL:  Lab Results   Component Value Date    HDL 54 08/18/2017    LDLCALC 138 08/12/2013    TRIG 134 08/18/2017     LIVER PROFILE:  Recent Labs     05/20/19  0922   AST 20   ALT 27   LABALBU 3.5       Intake/Output Summary (Last 24 hours) at 5/22/2019 1419  Last data filed at 5/22/2019 1255  Gross per 24 hour   Intake --   Output 550 ml   Net -550 ml       IMPRESSION:    Patient Active Problem List   Diagnosis    SHAWANDA (obstructive sleep apnea)    CHF (congestive heart failure) ([address]    CAD (coronary artery disease)    Cerebral infarction ([address]    Hearing loss    Sleep apnea    Insomnia    CKD (chronic kidney disease), stage III ([address]    Benign hypertension with CKD (chronic kidney disease) stage III (HCC)    Constipation    Depression    Edema    Vitamin D deficiency    Recurrent major depressive disorder, in partial remission ([address]    Chest pain    Hypertension    Repeated falls    Elevated troponin           RECOMMENDATIONS:  CAD , CABG 2006 5V , WITH ELEVATED TROP NO MORE CHEST PAIN   AS PT IS DNRCC COMFORT MEASURE WILL TREAT MEDICALLY   HTN WELL CONTROLLED   VQSCAN LOW PROBABILITY   HTN ON THE LOWER SIDE WILL REDUCE COREG AS PER PARAMETERS   CVA OLD GETTING REHAB   CONTINUE REST   NO MORE TROP  PLEASE CALL IF NEEDED     Discussed with patient and nursing.     [facility]        [phone]

## 2019-05-22 NOTE — PROGRESS NOTES
Message sent to HAIR GERONIMO CNP regarding code status. Patient is listed in computer as \"Full Code\" has paperwork from [address] [place] [address] for [employer] Orders received for code status to be corrected in Epic.

## 2019-05-22 NOTE — PROGRESS NOTES
Message sent to HAIR GERONIMO CNP regarding clarification of STAT CT chest order. Order changed from CT chest to VQ scan to rule out PE. Per SIXTO Barton VQ scan to be done routine and not STAT.

## 2019-05-22 NOTE — H&P
[address] [place] [address]    HISTORY AND PHYSICAL EXAMINATION            Date:   5/22/2019  Patient name:  Ofelia Ramos  Date of admission:  5/22/2019  2:01 AM  MRN:   [id number]  Account:  [de-identified]  YOB: 1934  PCP:    Danyel Soriano MD  Room:   [facility]  Code Status:    DNR-CC    Chief Complaint:     Chief Complaint   Patient presents with    Chest Pain     PAtient was transfered from [address] [place] [address] for Chest Pain. EMS gave 1 SL ntg and 324mg ASA and arrivied with IV to right wrist (Inflitrated and discontinued). Currently denies chest pain. History Obtained From:     patient, electronic medical record    History of Present Illness:     Robinson Loredo is a 80 y.o. male who presents with Chest Pain (PAtient was transfered from [address] [place] [address] for Chest Pain. EMS gave 1 SL ntg and 324mg ASA and arrivied with IV to right wrist (Inflitrated and discontinued). Currently denies chest pain. )  He was admitted through the ED May 22 for the management of chest pain. He had 5 vessel CABG in 2006. He does not recall any recent stress testing. Pt's code is [facility]. He would be agreeable to cardiac work up and intervention  If needed. He developed left sided dull achey chest pain May 21 at 10am while lying in bed. It was worse with position changes. He received NTG with some relief. He denied SOB but felt better with oxygen. There was no palpitations, N/V or diaphoresis. The pain returned at 10 pm and staff at [address] [place] [address] sent him to the ER. In the ER his pain was relieved with NTG SL x 1. EKG showed RBBB and 1st degree AV block. Trended troponins 97, 138, . Creat is elevated at 1.4 but the rest of his routine labs are unremarkable. CXR showed low lung volumes.   He was admitted for observation and had a VQ scan done this am.      He was recently started on Prednisone for exacerbation of chronic low back pain. He also takes Norco prn. Past Medical History:     Past Medical History:   Diagnosis Date    Acute hypokalemia     Anxiety     Benign prostate hyperplasia     Benign prostatic hyperplasia     CAD (coronary artery disease)     CHF (congestive heart failure) ([facility])     Chronic kidney disease     stage 2    Chronic renal disease, stage II     Cognitive communication deficit     COPD (chronic obstructive pulmonary disease) ([facility])     Coronary atherosclerosis of native coronary artery     Depression     Dysphagia     General weakness     Hearing loss     Hearing loss     Heart attack ([facility] 2006    5 bypasses    Heart failure, unspecified ([facility]     Hemiplegia and hemiparesis following cerebral infarction affecting left non-dominant side ([facility])     Hemiplegia and hemiparesis following unspecified cerebrovascular disease affecting left non-dominant side ([facility])     affecting left    Hyperlipidemia     Hypertension     Insomnia     Repeated falls     Unspecified sleep apnea         Past Surgical History:     Past Surgical History:   Procedure Laterality Date    CATARACT REMOVAL      CORONARY ARTERY BYPASS GRAFT  2006    5 bypass        Medications Prior to Admission:     Prior to Admission medications    Medication Sig Start Date End Date Taking? Authorizing Provider   bisacodyl (DULCOLAX) 10 MG suppository Place 10 mg rectally daily   Yes Historical Provider, MD   predniSONE (DELTASONE) 20 MG tablet Take 20 mg by mouth daily   Yes Historical Provider, MD   senna-docusate (Ulysess Nikolai) 8.6-50 MG per tablet Take 2 tablets by mouth daily   Yes Historical Provider, MD   budesonide-formoterol (SYMBICORT) 160-4.5 MCG/ACT AERO Inhale 2 puffs into the lungs 2 times daily   Yes Historical Provider, MD   HYDROcodone-acetaminophen (NORCO) 5-325 MG per tablet Take 1 tablet by mouth every 6 hours as needed for Pain for up to 3 days.  5/20/19 5/23/19 Yes Sol Domínguez, DO   acetaminophen (TYLENOL) 325 MG tablet Take 650 mg by mouth every 6 hours as needed for Pain   Yes Historical Provider, MD   bisacodyl (DULCOLAX) 5 MG EC tablet Take 10 mg by mouth daily as needed for Constipation   Yes Historical Provider, MD   ALPRAZolam Aaron Manner) 0.5 MG tablet Take 0.5 mg by mouth nightly as needed for Sleep   Yes Historical Provider, MD   bumetanide (BUMEX) 0.5 MG tablet Take 1 tablet by mouth daily  Patient taking differently: Take 1 mg by mouth daily  8/11/17  Yes Rock Walker MD   atorvastatin (LIPITOR) 10 MG tablet Take 10 mg by mouth daily   Yes Historical Provider, MD   albuterol sulfate  (90 BASE) MCG/ACT inhaler Inhale 2 puffs into the lungs every 6 hours as needed for Wheezing 2/19/16  Yes Rock Walker MD   aspirin 81 MG tablet Take 1 tablet by mouth daily 2/19/16  Yes Rock Walker MD   carvedilol (COREG) 12.5 MG tablet Take 1 tablet by mouth 2 times daily (with meals)  Patient taking differently: Take 12.5 mg by mouth daily  2/19/16  Yes Rock Walker MD   clopidogrel (PLAVIX) 75 MG tablet Take 1 tablet by mouth daily 2/19/16  Yes Rock Walker MD   docusate sodium (COLACE) 100 MG capsule Take 1 capsule by mouth daily as needed for Constipation 2/19/16  Yes Rock Walker MD   spironolactone (ALDACTONE) 25 MG tablet Take 1 tablet by mouth 2 times daily  Patient taking differently: Take 25 mg by mouth daily  2/19/16  Yes Rock Walker MD   traZODone (DESYREL) 50 MG tablet Take 1 tablet by mouth nightly  Patient taking differently: Take 150 mg by mouth nightly  2/19/16  Yes Rokc Walker MD        Allergies:     Patient has no known allergies. Social History:     Tobacco:    reports that he has quit smoking. His smoking use included cigars. He has never used smokeless tobacco.  Alcohol:      reports that he drinks alcohol. Drug Use:  reports that he does not use drugs.     Family History:     Family History   Problem Relation Age of Onset    Other Mother Hip fracture    Other Father     Cancer Brother         bone marrow    Diabetes Maternal Grandfather        Review of Systems:     Positive and Negative as described in HPI. CONSTITUTIONAL:  negative for fevers, chills, sweats, fatigue, weight loss  HEENT:  negative for vision, hearing changes, runny nose, throat pain  RESPIRATORY:  negative for shortness of breath, + chronic cough phlegm in back of the throat, no congestion, wheezing. CARDIOVASCULAR:  negative for chest pain, palpitations. GASTROINTESTINAL:  negative for nausea, vomiting, diarrhea, + chronic constipation, no change in bowel habits, abdominal pain   GENITOURINARY:  negative for difficulty of urination, burning with urination, frequency   INTEGUMENT:  negative for rash, skin lesions, easy bruising   HEMATOLOGIC/LYMPHATIC:  negative for swelling/edema   ALLERGIC/IMMUNOLOGIC:  negative for urticaria , itching  ENDOCRINE:  negative increase in drinking, increase in urination, hot or cold intolerance  MUSCULOSKELETAL:  +low back pain which has worsened over the last month,  No specific muscle aches, swelling of joints  NEUROLOGICAL:  negative for headaches, dizziness, lightheadedness, + chronic numbness left arm and leg, no pain, tingling extremities  BEHAVIOR/PSYCH:  negative for depression, anxiety    Physical Exam:   /64   Pulse 80   Temp 97.7 °F (36.5 °C) (Oral)   Resp 18   Ht 5' 6\" (1.676 m)   Wt 233 lb 14.5 oz (106.1 kg)   SpO2 94%   BMI 37.75 kg/m²   Temp (24hrs), Av.7 °F (36.5 °C), Min:97.4 °F (36.3 °C), Max:98.2 °F (36.8 °C)    No results for input(s): POCGLU in the last 72 hours.     Intake/Output Summary (Last 24 hours) at 2019 1639  Last data filed at 2019 1427  Gross per 24 hour   Intake 450 ml   Output 850 ml   Net -400 ml       General Appearance:  alert, chronically ill appearing, obese, and in no acute distress  Mental status: oriented to person, place, and time with normal affect, responses are appropriate but delayed at times  Head:  normocephalic, atraumatic. Eye: no icterus, redness, pupils equal and reactive, extraocular eye movements intact, conjunctiva clear  Ear: normal external ear, no discharge, hearing intact  Nose:  no drainage noted, no sinus tenderness  Mouth: mucous membranes dry, dentition fair. Slight droop left corner of mouth  Neck: supple, no carotid bruits, thyroid not palpable, short thick neck  Lungs: Bilateral equal air entry, clear to ausculation, no wheezing, rales or rhonchi, shallow effort  Cardiovascular: normal rate, regular rhythm, no murmur, gallop, rub. Abdomen: Softly obese, nontender, nondistended, normal bowel sounds, no hepatomegaly or splenomegaly  Neurologic: There are no new focal motor or sensory deficits, normal muscle tone and bulk, no abnormal sensation, normal speech, cranial nerves II through XII grossly intact. Left arm flacid, left leg weak.   Skin: No gross lesions, rashes, bruising or bleeding on exposed skin area  Extremities:  Abrasion Left knee and Rt shin  peripheral pulses palpable, trace pedal edema, no calf pain with palpation  Psych: normal affect     Investigations:      Laboratory Testing:  Recent Results (from the past 24 hour(s))   EKG 12 Lead    Collection Time: 05/22/19  1:55 AM   Result Value Ref Range    Ventricular Rate 89 BPM    Atrial Rate 89 BPM    P-R Interval 232 ms    QRS Duration 144 ms    Q-T Interval 414 ms    QTc Calculation (Bazett) 503 ms    P Axis 73 degrees    R Axis -141 degrees    T Axis 60 degrees   CBC Auto Differential    Collection Time: 05/22/19  2:08 AM   Result Value Ref Range    WBC 6.8 3.5 - 11.0 k/uL    RBC 4.04 (L) 4.5 - 5.9 m/uL    Hemoglobin 11.7 (L) 13.5 - 17.5 g/dL    Hematocrit 35.4 (L) 41 - 53 %    MCV 87.6 80 - 100 fL    MCH 29.1 26 - 34 pg    MCHC 33.2 31 - 37 g/dL    RDW 14.8 12.5 - 15.4 %    Platelets 089 694 - 621 k/uL    MPV 9.1 6.0 - 12.0 fL    NRBC Automated NOT REPORTED per 100 WBC    Differential Type NOT REPORTED     Seg Neutrophils 69 (H) 36 - 66 %    Lymphocytes 18 (L) 24 - 44 %    Monocytes 8 2 - 11 %    Eosinophils % 4 1 - 4 %    Basophils 1 0 - 2 %    Immature Granulocytes NOT REPORTED 0 %    Segs Absolute 4.70 1.8 - 7.7 k/uL    Absolute Lymph # 1.30 1.0 - 4.8 k/uL    Absolute Mono # 0.50 0.1 - 1.2 k/uL    Absolute Eos # 0.30 0.0 - 0.4 k/uL    Basophils # 0.00 0.0 - 0.2 k/uL    Absolute Immature Granulocyte NOT REPORTED 0.00 - 0.30 k/uL    WBC Morphology NOT REPORTED     RBC Morphology NOT REPORTED     Platelet Estimate NOT REPORTED    Basic Metabolic Panel w/ Reflex to MG    Collection Time: 05/22/19  2:08 AM   Result Value Ref Range    Glucose 119 (H) 70 - 99 mg/dL    BUN 17 8 - 23 mg/dL    CREATININE 1.41 (H) 0.70 - 1.20 mg/dL    Bun/Cre Ratio NOT REPORTED 9 - 20    Calcium 9.0 8.6 - 10.4 mg/dL    Sodium 140 135 - 144 mmol/L    Potassium 4.5 3.7 - 5.3 mmol/L    Chloride 102 98 - 107 mmol/L    CO2 26 20 - 31 mmol/L    Anion Gap 12 9 - 17 mmol/L    GFR Non-African American 48 (L) >60 mL/min    GFR  58 (L) >60 mL/min    GFR Comment          GFR Staging NOT REPORTED    Troponin    Collection Time: 05/22/19  2:08 AM   Result Value Ref Range    Troponin, High Sensitivity 97 (HH) 0 - 22 ng/L    Troponin T NOT REPORTED <0.03 ng/mL    Troponin Interp NOT REPORTED    D-Dimer, Quantitative    Collection Time: 05/22/19  2:08 AM   Result Value Ref Range    D-Dimer, Quant 0.58 mg/L FEU   Brain Natriuretic Peptide    Collection Time: 05/22/19  2:08 AM   Result Value Ref Range    Pro- (H) <300 pg/mL    BNP Interpretation Pro-BNP Reference Range:    Troponin    Collection Time: 05/22/19  6:30 AM   Result Value Ref Range    Troponin, High Sensitivity 138 (HH) 0 - 22 ng/L    Troponin T NOT REPORTED <0.03 ng/mL    Troponin Interp NOT REPORTED    Troponin    Collection Time: 05/22/19 11:16 AM   Result Value Ref Range    Troponin, High Sensitivity 157 (HH) 0 - 22 ng/L    Troponin T NOT REPORTED <0.03 ng/mL    Troponin Interp NOT REPORTED        Imaging/Diagnostics:    Ct Abdomen Pelvis Wo Contrast  5/21/2019  No acute findings. Multilevel degenerative changes. Xr Chest Portable  Result Date: 5/22/2019  1. Low lung volumes, stable. Assessment :      Primary Problem  Non-ST elevation MI (NSTEMI) [facility])    Active Hospital Problems    Diagnosis Date Noted    Chest pain [R07.9] 05/22/2019    Left hemiparesis ([facility] [G81.94] 05/22/2019    First degree AV block [I44.0] 05/22/2019    Right bundle branch block [I45.10] 05/22/2019    Chronic midline low back pain without sciatica [M54.5, G89.29] 05/22/2019    Non-ST elevation MI (NSTEMI) ([facility] [I21.4] 05/22/2019    Hypertension [I10]     Repeated falls [R29.6]     Elevated troponin [R74.8]     Vitamin D deficiency [E55.9] 03/02/2016    CKD (chronic kidney disease), stage III ([facility] [N18.3] 02/09/2016    Sleep apnea [G47.30]     Cerebral infarction ([facility] [I63.9] 05/07/2014    CAD (coronary artery disease) [I25.10] 03/01/2013    CHF (congestive heart failure) ([facility] [I50.9] 03/01/2013    SHAWANDA (obstructive sleep apnea) [G47.33] 03/01/2013       Plan:     Patient status Admit as observation in the  Med/Surge    1. Follow up on 3rd troponin and VQ scan  2. Consult Cardiology  3. Manage diabetes and HTN    4. CBC, CMP, Lipid panel in am  5. Pt updated and agreeable    Consultations:   IP CONSULT TO CARDIOLOGY    Patient is admitted as observation status because of co-morbidities listed above, severity of signs and symptoms as outlined, requirement for current medical therapies and most importantly because of direct risk to patient if care not provided in a hospital setting. Abby Cleary DO  5/22/2019  4:39 PM    I have seen and examined Elizabeth Thompson and the padilla elements of all parts of the encounter have been performed by me. I agree with the assessment, plan and orders as documented by the Advanced Practice Provider.  Any modifications to the exam findings and the plan of treatment is as below and the final version is my approved version of the assessment. Additional Comments:   80-year-old male admitted to the hospital through the emergency room from his extended care facility  His main concern has been chronic low back pain  Per ER:  Cata Thakkar is a 80 y.o. male who presents to the emergency department brought in via EMS transferred from [address] [place] [address] patient has been having intermittent substernal chest pain nonradiating in nature since 3 p.m. yesterday afternoon. Patient has been dull aching in nature 7 out of 10 in intensity. Patient denies any nausea, shortness of breath, lightheadedness, dizziness, palpitations or diaphoresis. He also denies any swelling in the legs or calf pain. No history of cough, fever or chills. Chest pain has been lasting maximum 5 minutes at a time. Patient was given one sublingual nitroglycerin with the relief of the pain. He was also given aspirin 325 mg orally prior to coming to the emergency department. Upon arrival patient is chest pain-free. Patient has history of corneal artery disease and congestive heart failure and has undergone the coronary artery bypass graft surgery of 5 vessels in 2006. He has not had any recent stress test.  His code status is [facility].  \"    It appears that the patient was also in the emergency room on 5/20 back pain and emesis    The patient is currently up in the chair  He is rather restless  He states that when he sits too long his back gets worse  He is non-ambulatory and generally uses a scooter    The patient does have a rather complicated cardiac history  He does not follow with a cardiologist    The patient does acknowledge intermittent chest discomfort  It is nonexertional  The pain does not radiate  It is not associated with dyspnea, diaphoresis, dyspepsia  He can identify no relieving factors    The patient is currently resting comfortably in the chair  His discomfort has resolved  He continues to have back pain    Heart is regular rate and rhythm  Airflow decreased, rare rhonchi  Abdomen soft and flat, no tenderness  The extremities reveal mild edema  No calf tenderness  Chronic left hemiparesis from prior stroke    Database has included:  Hemoglobin 11.7Low     CREATININE 1. 41High     Pro-BNP 598High     Troponin, High Sensitivity 97High Panic      D-Dimer, Quant 0.58   VQ scan:  Low probability for pulmonary embolus.     EKG:  Sinus rhythm with 1st degree A-V block  Right bundle branch block  Inferior infarct , age undetermined  Abnormal ECG  No previous ECGs available    The patient has been admitted  Aspirin  Lipitor  Coreg  Cardiology consulted  Concerns for non-ST elevation MI  Optimize cardiopulmonary function  Monitor renal function  Suggest outpatient anemia workup  CPAP   Ongoing risk factor management  Ongoing pain management  DNR CC per patient request     Electronically signed by Nik Quinteros DO on 5/22/2019 at 4:39 PM      Copy sent to Dr. Albaro Ramos MD

## 2019-05-22 NOTE — PLAN OF CARE
Smoking Cessation - topics covered   []  Health Risks  []  Benefits of Quitting   []  Smoking Cessation  []  Patient has no history of tobacco use  [x]  Patient is former smoker. Patient quit in (unsure)  [x]  No need for tobacco cessation education. []  Booklet given  []  Patient verbalizes understanding. [x]  Patient denies need for tobacco cessation education. []  Unable to meet with patient today. Will follow up as able.   Nile Hunt  1:50 PM

## 2019-05-22 NOTE — PLAN OF CARE
Pt educated on use of Dulera  MDI and spacer Pt in no distress at this time BS diminished will monitor

## 2019-05-22 NOTE — PROGRESS NOTES
Occupational Therapy   Occupational Therapy Initial Assessment  Date: 2019   Patient Name: Xiao Medrano  MRN: [id number]     : 1934    Date of Service: 2019    Discharge Recommendations:      Assessment   Performance deficits / Impairments: Decreased functional mobility ; Decreased ADL status; Decreased safe awareness;Decreased endurance;Decreased balance  Prognosis: Good  Decision Making: Medium Complexity  Patient Education: OT Services/OT POC, Safety Awareness/Fall Prevention, Safety with Transfers/Use of Otila Vu, ADL Techniques, Bed Mobility Techniques, good return  REQUIRES OT FOLLOW UP: Yes  Activity Tolerance  Activity Tolerance: Patient Tolerated treatment well  Safety Devices  Safety Devices in place: Yes  Type of devices: Call light within reach;Nurse notified; Left in chair;Chair alarm in place  Restraints  Initially in place: No           Patient Diagnosis(es): The primary encounter diagnosis was Chest pain, unspecified type. A diagnosis of Elevated troponin was also pertinent to this visit. has a past medical history of Acute hypokalemia, Anxiety, Benign prostate hyperplasia, Benign prostatic hyperplasia, CAD (coronary artery disease), CHF (congestive heart failure) ([address] Chronic kidney disease, Chronic renal disease, stage II, Cognitive communication deficit, COPD (chronic obstructive pulmonary disease) ([address] Coronary atherosclerosis of native coronary artery, Depression, Dysphagia, General weakness, Hearing loss, Hearing loss, Heart attack ([address] Heart failure, unspecified ([address] Hemiplegia and hemiparesis following cerebral infarction affecting left non-dominant side ([address] Hemiplegia and hemiparesis following unspecified cerebrovascular disease affecting left non-dominant side ([address] Hyperlipidemia, Hypertension, Insomnia, Repeated falls, and Unspecified sleep apnea.    has a past surgical history that includes Coronary artery bypass graft () and Cataract removal. Restrictions  Restrictions/Precautions  Restrictions/Precautions: Fall Risk, Isolation, Contact Precautions, Up as Tolerated  Required Braces or Orthoses?: No  Position Activity Restriction  Other position/activity restrictions: L sided deficits from prior CVA    Subjective   General  Chart Reviewed: No  Patient assessed for rehabilitation services?: Yes  Family / Caregiver Present: No  General Comment  Comments: RN ok'd for therapy this AM. Pt agreeable to participate in session and eager for EOB/OOB activity. Pain Assessment  Pt reports 5/10 pain level to lower back. Pt states this is chronic pain. Pt repositioned. Response to Pain Intervention: Patient Satisfied     Social/Functional History  Social/Functional History  Lives With: Other (comment)  Type of Home: Facility(Long-term at [facility])  Home Layout: One level  Home Access: Level entry  Bathroom Shower/Tub: Walk-in shower  Bathroom Toilet: Standard  Bathroom Equipment: Grab bars in shower, Shower chair, Grab bars around toilet  Bathroom Accessibility: [address] bed, Wheelchair-electric  Receives Help From: Personal care attendant(at facility)  ADL Assistance: Needs assistance(Pt reports able to perform feeding/grooming with setup; pt reports assistance required for all UB/LB and toileting ADLs)  Homemaking Assistance: Needs assistance  Homemaking Responsibilities: No(all performed by facility attendants)  Ambulation Assistance: Needs assistance(pt reports ability to ambulate ~2-3 steps with assist x1)  Transfer Assistance: Needs assistance(pt reports assist x1 to transfer to/from wheelchair)  Active : No  Patient's  Info: facility or family provide transportation  Occupation: Retired  Leisure & Hobbies: Enjoys cooking       Objective   Vision: Within Functional Limits  Hearing: Within functional limits    Orientation  Overall Orientation Status: Within Functional Limits    ADL  Feeding: Setup; Increased time to complete  Grooming: Contact guard assistance; Increased time to complete  UE Bathing: Moderate assistance; Increased time to complete  LE Bathing: Maximum assistance; Increased time to complete  UE Dressing: Moderate assistance; Increased time to complete  LE Dressing: Maximum assistance; Increased time to complete  Toileting: Maximum assistance; Increased time to complete    RUE Tone: Normotonic  LUE Tone: Hypertonic  Tone Description: Flexor and extensor tone with rigidity  Movements Are Fluid And Coordinated: No        Bed mobility  Rolling to Right: Moderate assistance  Supine to Sit: Moderate assistance(with raised HOB and use of bed rails)  Sit to Supine: Unable to assess(Pt retired up to chair at end of session)  Scooting: Moderate assistance     Balance  Sitting Balance: Stand by assistanace;Contact guard assistance (SBA with RUE support on hand rail, CGA without UE support)  Standing Balance: Mod A x2 with R hand held assistance during static standing and functional transfer bed > chair. Pt demo ~1 minute standing tolerance. Transfers  Stand Step Transfers: Moderate assistance;2 Person assistance  Sit to stand: Moderate assistance;2 Person assistance  Stand to sit: Minimal assistance;2 Person assistance  Transfer Comments: Mod verbal cues for proper hand placement/safety techniques. Pt performed stand pivot transfer bed > chair with mod A x2 with LLE blocking required. Pt and RN educated to use leo pulido during transfer of pt from chair back to bed, pt and RN verbalized understanding.         Cognition  Overall Cognitive Status: WFL       Sensation  Overall Sensation Status: WFL(Pt denies numbness or tingling)        LUE PROM: Exceptions  L Shoulder Flex  0-170: ~0-90 with passive stretch  L Elbow Flex/Ext 0-145: `0-90 with prolonged stretch  L Forearm Sup  0-90: ~ 0-30  L Wrist Flex 0-80: ~ 0-20  L Wrist Ext 0-70: ~0-10  Left Hand PROM: Exceptions (flexion contractures in digits 1-5 at DIPs)  RUE AROM : WFL  Right Hand AROM: WFL    LUE Strength  Gross LUE Strength: Exceptions to WFL (minimal active movement to LUE)  RUE Strength  Gross RUE Strength: WFL(grossly 4+/5)        Plan   Plan  Times per week: 5-6x/wk  Current Treatment Recommendations: Balance Training, Endurance Training, Equipment Evaluation, Education, & procurement, Patient/Caregiver Education & Training, Safety Education & Training, Self-Care / ADL(Functional Transfer Training)      AM-PAC Score  [place] AM-PAC \"6-Clicks\" ADL Inpatient  Score: 14/24    Goals  Short term goals  Time Frame for Short term goals: 14 visits  Short term goal 1: perform functional transfers with mod A using least restrictive AD for increased participation in ADLs  Short term goal 2: perform grooming tasks with setup  Short term goal 3: perform UB ADL tasks with min A using adaptive equipment/durable medical equipment as needed  Short term goal 4: demo 2+ minutes static standing tolerance for increased independence with functional transfers  Short term goal 5: perform toileting tasks with mod A using adaptive equipment/durable medical equipment as needed       Therapy Time   Individual Concurrent Group Co-treatment   Time In 1154         Time Out 1234         Minutes 40         Timed Code Treatment Minutes: 12 Minutes       Jaquan Webster OTR/L

## 2019-05-22 NOTE — FLOWSHEET NOTE
Situation:   received referral from Medical Assistant to visit Pt. Assessment:  Mr. Prashant Mckenna is a 81 y/o male who was admitted to the hospital this morning from his residence, [address] [place] [address]. Mr. Prashant Mckenna was sitting in the bedside chair when  introduced herself. He welcomed her visit. He shared about his family business that he developed in [facility]. 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 He smiled as he talked about his love of flowers and his business style. He shared stories of his life, including that he was born on the boat coming from [facility] and how he was grew up [place]. He described his father as hard working and his mother as someone who taught him and his seven siblings to be \"moral.\"    Mr. Prashant Mckenna talked about his political and Spiritism views. He is [employer] and attends Mass at his residence. He shared that he has a close relationship with some priests in the area. He expressed a desire to receive the Sacrament of the Sick from the  who visits his community. Mr. Prashant Mckenna expressed some concerns, including not liking his place of residence and feeling lonely. He shared that his children do not visit often and that he would rather live in his own apartment with round the clock support from a nurse. Mr. Prashant Mckenna shared stories and memories of his life. He admitted to living a lavish life at one point, which included traveling and membership at a prestigious golf club. Mr. Prashant Mckenna showed the rosary that was blessed by the [place] when he and his favorite uncle, with whom he traveled often, went to [place]. He smiled as he talked about his experience in [address] [place] [address] and his close relationship with his uncle, who  three years ago. He processed some regrets, including his divorce. He admitted to not being able to forgive himself for his choices. He stated his belief that he has been forgiven by God through the [place] of Reconciliation. However, it is clear that he continues to have judgment against himself (\"I was dumb\") about his part in the end of his marriage. He stated that he does not have peace and that he believes something is \"wrong with him. \" He wondered why God has let him live in the condition he is in. Mr. Arnold Akins talked about his close relationship with the  who serves his community and another  in the [place]. He also mentioned one of the chaplains with whom he plays cards at his residence. He smiled when sharing about these individuals. He shared that he has a framed picture of his uncle in his apartment and that this helps him. As he was talking about a relationship with a , Mr. Arnold Akins closed his eyes and appeared to have fallen asleep. As  began exiting the room, he woke up. He was open to reciting traditional Rastafarian prayers before  left. Intervention:   provided supportive presence and explored Pt's coping and needs.  listened as Pt shared stories and memories of his life.  inquired about Pt's attitudes and beliefs, including about his attitude toward himself and his past actions.  reinforced Pt's awareness that he is forgiven by God and encouraged Pt to remember this.  offered words of encouragement and support.  prayed the \"Our Father\" and the [facility]" with Pt and wished him well. Outcome:  Mr. Arnold Akins joined in praying the traditional Rastafarian prayers. He expressed thanks for the visit.        05/22/19 8715   Encounter Summary   Services provided to: Patient   Referral/Consult From: [address] Visiting   (5/22/19)   Complexity of Encounter Moderate   Length of Encounter 45 minutes   Spiritual Assessment Completed Yes   Spiritual/Muslim   Type Spiritual support   Assessment Calm; Approachable;Coping; Hopeful   Intervention Active listening;Explored feelings, thoughts, concerns;Explored coping resources;Sustaining presence/ Ministry of presence; Discussed relationship with God;Discussed meaning/purpose;Discussed belief system/Anabaptist practices/stephanie   Outcome Hopeful;Receptive;Encouraged;Expressed gratitude;Engaged in conversation;Expressed feelings/needs/concerns;Coping;Expressed regrets     Electronically signed by Cheyanne Dowling, Oncology Outpatient [facility], [facility] Oncology  5/22/2019  5:29 PM

## 2019-05-23 VITALS
BODY MASS INDEX: 37.8 KG/M2 | TEMPERATURE: 98.3 F | RESPIRATION RATE: 18 BRPM | OXYGEN SATURATION: 95 % | WEIGHT: 235.23 LBS | SYSTOLIC BLOOD PRESSURE: 109 MMHG | DIASTOLIC BLOOD PRESSURE: 58 MMHG | HEIGHT: 66 IN | HEART RATE: 95 BPM

## 2019-05-23 LAB
ALBUMIN SERPL-MCNC: 3.4 G/DL (ref 3.5–5.2)
ALBUMIN/GLOBULIN RATIO: 1.2 (ref 1–2.5)
ALP BLD-CCNC: 52 U/L (ref 40–129)
ALT SERPL-CCNC: 20 U/L (ref 5–41)
ANION GAP SERPL CALCULATED.3IONS-SCNC: 12 MMOL/L (ref 9–17)
AST SERPL-CCNC: 20 U/L
BILIRUB SERPL-MCNC: 0.32 MG/DL (ref 0.3–1.2)
BUN BLDV-MCNC: 16 MG/DL (ref 8–23)
BUN/CREAT BLD: ABNORMAL (ref 9–20)
CALCIUM SERPL-MCNC: 8.8 MG/DL (ref 8.6–10.4)
CHLORIDE BLD-SCNC: 99 MMOL/L (ref 98–107)
CHOLESTEROL/HDL RATIO: 3.5
CHOLESTEROL: 177 MG/DL
CO2: 27 MMOL/L (ref 20–31)
CREAT SERPL-MCNC: 1.27 MG/DL (ref 0.7–1.2)
GFR AFRICAN AMERICAN: >60 ML/MIN
GFR NON-AFRICAN AMERICAN: 54 ML/MIN
GFR SERPL CREATININE-BSD FRML MDRD: ABNORMAL ML/MIN/{1.73_M2}
GFR SERPL CREATININE-BSD FRML MDRD: ABNORMAL ML/MIN/{1.73_M2}
GLUCOSE BLD-MCNC: 111 MG/DL (ref 70–99)
HCT VFR BLD CALC: 34.7 % (ref 41–53)
HDLC SERPL-MCNC: 50 MG/DL
HEMOGLOBIN: 11.6 G/DL (ref 13.5–17.5)
LDL CHOLESTEROL: 101 MG/DL (ref 0–130)
MAGNESIUM: 2.2 MG/DL (ref 1.6–2.6)
MCH RBC QN AUTO: 29 PG (ref 26–34)
MCHC RBC AUTO-ENTMCNC: 33.4 G/DL (ref 31–37)
MCV RBC AUTO: 86.6 FL (ref 80–100)
NRBC AUTOMATED: ABNORMAL PER 100 WBC
PDW BLD-RTO: 14.9 % (ref 12.5–15.4)
PLATELET # BLD: 219 K/UL (ref 140–450)
PMV BLD AUTO: 9.2 FL (ref 6–12)
POTASSIUM SERPL-SCNC: 4.8 MMOL/L (ref 3.7–5.3)
RBC # BLD: 4.01 M/UL (ref 4.5–5.9)
SODIUM BLD-SCNC: 138 MMOL/L (ref 135–144)
TOTAL PROTEIN: 6.3 G/DL (ref 6.4–8.3)
TRIGL SERPL-MCNC: 129 MG/DL
VLDLC SERPL CALC-MCNC: NORMAL MG/DL (ref 1–30)
WBC # BLD: 6.9 K/UL (ref 3.5–11)

## 2019-05-23 PROCEDURE — 6370000000 HC RX 637 (ALT 250 FOR IP): Performed by: INTERNAL MEDICINE

## 2019-05-23 PROCEDURE — 99238 HOSP IP/OBS DSCHRG MGMT 30/<: CPT | Performed by: CLINICAL NURSE SPECIALIST

## 2019-05-23 PROCEDURE — G0378 HOSPITAL OBSERVATION PER HR: HCPCS

## 2019-05-23 PROCEDURE — 2580000003 HC RX 258: Performed by: NURSE PRACTITIONER

## 2019-05-23 PROCEDURE — 99238 HOSP IP/OBS DSCHRG MGMT 30/<: CPT | Performed by: INTERNAL MEDICINE

## 2019-05-23 PROCEDURE — 97116 GAIT TRAINING THERAPY: CPT

## 2019-05-23 PROCEDURE — 83735 ASSAY OF MAGNESIUM: CPT

## 2019-05-23 PROCEDURE — 97535 SELF CARE MNGMENT TRAINING: CPT

## 2019-05-23 PROCEDURE — 80061 LIPID PANEL: CPT

## 2019-05-23 PROCEDURE — 80053 COMPREHEN METABOLIC PANEL: CPT

## 2019-05-23 PROCEDURE — 85027 COMPLETE CBC AUTOMATED: CPT

## 2019-05-23 PROCEDURE — 6360000002 HC RX W HCPCS: Performed by: NURSE PRACTITIONER

## 2019-05-23 PROCEDURE — 36415 COLL VENOUS BLD VENIPUNCTURE: CPT

## 2019-05-23 PROCEDURE — 6370000000 HC RX 637 (ALT 250 FOR IP): Performed by: NURSE PRACTITIONER

## 2019-05-23 PROCEDURE — 94640 AIRWAY INHALATION TREATMENT: CPT

## 2019-05-23 RX ORDER — NITROGLYCERIN 0.4 MG/1
TABLET SUBLINGUAL
Qty: 25 TABLET | Refills: 3 | Status: SHIPPED | OUTPATIENT
Start: 2019-05-23

## 2019-05-23 RX ORDER — SPIRONOLACTONE 25 MG/1
25 TABLET ORAL DAILY
Qty: 30 TABLET | Refills: 3 | Status: SHIPPED | OUTPATIENT
Start: 2019-05-24

## 2019-05-23 RX ORDER — CARVEDILOL 6.25 MG/1
6.25 TABLET ORAL DAILY
Qty: 60 TABLET | Refills: 3 | Status: SHIPPED | OUTPATIENT
Start: 2019-05-24

## 2019-05-23 RX ORDER — BUMETANIDE 1 MG/1
1 TABLET ORAL DAILY
Qty: 30 TABLET | Refills: 3 | Status: SHIPPED | OUTPATIENT
Start: 2019-05-24

## 2019-05-23 RX ADMIN — SPIRONOLACTONE 25 MG: 25 TABLET, FILM COATED ORAL at 08:10

## 2019-05-23 RX ADMIN — SENNOSIDES, DOCUSATE SODIUM 2 TABLET: 50; 8.6 TABLET, FILM COATED ORAL at 08:10

## 2019-05-23 RX ADMIN — Medication 10 ML: at 08:11

## 2019-05-23 RX ADMIN — BISACODYL 10 MG: 10 SUPPOSITORY RECTAL at 09:47

## 2019-05-23 RX ADMIN — BUMETANIDE 1 MG: 1 TABLET ORAL at 08:10

## 2019-05-23 RX ADMIN — CLOPIDOGREL BISULFATE 75 MG: 75 TABLET ORAL at 08:10

## 2019-05-23 RX ADMIN — CARVEDILOL 6.25 MG: 3.12 TABLET, FILM COATED ORAL at 08:10

## 2019-05-23 RX ADMIN — ASPIRIN 325 MG: 325 TABLET, DELAYED RELEASE ORAL at 08:10

## 2019-05-23 RX ADMIN — ENOXAPARIN SODIUM 40 MG: 40 INJECTION SUBCUTANEOUS at 06:28

## 2019-05-23 RX ADMIN — ATORVASTATIN CALCIUM 10 MG: 10 TABLET, FILM COATED ORAL at 08:10

## 2019-05-23 RX ADMIN — PREDNISONE 20 MG: 20 TABLET ORAL at 08:11

## 2019-05-23 RX ADMIN — MOMETASONE FUROATE AND FORMOTEROL FUMARATE DIHYDRATE 2 PUFF: 200; 5 AEROSOL RESPIRATORY (INHALATION) at 09:11

## 2019-05-23 ASSESSMENT — PAIN DESCRIPTION - ORIENTATION: ORIENTATION: LOWER

## 2019-05-23 ASSESSMENT — PAIN DESCRIPTION - PAIN TYPE: TYPE: CHRONIC PAIN

## 2019-05-23 ASSESSMENT — PAIN SCALES - GENERAL
PAINLEVEL_OUTOF10: 0
PAINLEVEL_OUTOF10: 0

## 2019-05-23 ASSESSMENT — PAIN DESCRIPTION - LOCATION: LOCATION: BACK

## 2019-05-23 NOTE — PROGRESS NOTES
[facility]    Progress Note    5/23/2019    6:46 AM    Name:   Cecy Gomez  MRN:     [id number]     Acct:      [de-identified]   Room:   [address] [place] [address] Day:  1  Admit Date:  5/22/2019  2:01 AM    PCP:   Nicolasa Tran MD  Code Status:  DNR-CC    Subjective:     C/C:   Chief Complaint   Patient presents with    Chest Pain     PAtient was transfered from [address] [place] [address] for Chest Pain. EMS gave 1 SL ntg and 324mg ASA and arrivied with IV to right wrist (Inflitrated and discontinued). Currently denies chest pain. Interval History Status: improved. Patient denies chest pain. He was evaluated by cardiology yesterday and his medications were adjusted. VQ scan was low probability for PE. Brief History:     Linda Medina is a 80 y.o. male who presents with Chest Pain (PAtient was transfered from [address] [place] [address] for Chest Pain. EMS gave 1 SL ntg and 324mg ASA and arrivied with IV to right wrist (Inflitrated and discontinued). Currently denies chest pain. )  He was admitted through the ED May 22 for the management of chest pain. He had 5 vessel CABG in 2006. He does not recall any recent stress testing. Pt's code is [facility]. He would be agreeable to cardiac work up and intervention  If needed. He developed left sided dull achey chest pain May 21 at 10am while lying in bed. It was worse with position changes. He received NTG with some relief. He denied SOB but felt better with oxygen. There was no palpitations, N/V or diaphoresis. The pain returned at 10 pm and staff at [address] [place] [address] sent him to the ER. In the ER his pain was relieved with NTG SL x 1. EKG showed RBBB and 1st degree AV block. Trended troponins 97, 138, . Creat is elevated at 1.4 but the rest of his routine labs are unremarkable. CXR showed low lung volumes.   He was admitted for observation and had a VQ scan done this am.       He was recently started on Prednisone for exacerbation of chronic low back pain. He also takes Norco prn. Review of Systems:     Constitutional:  negative for chills, fevers, sweats  Respiratory:  negative for cough, dyspnea on exertion, shortness of breath, wheezing  Cardiovascular:  negative for chest pain, chest pressure/discomfort, lower extremity edema, palpitations  Gastrointestinal:  negative for abdominal pain, + chronic constipation, no diarrhea, nausea, vomiting  Neurological:  negative for dizziness, headache    Medications:      Allergies:  No Known Allergies    Current Meds:   Scheduled Meds:    atorvastatin  10 mg Oral Daily    bisacodyl  10 mg Rectal Daily    mometasone-formoterol  2 puff Inhalation BID    bumetanide  1 mg Oral Daily    clopidogrel  75 mg Oral Daily    predniSONE  20 mg Oral Daily    sennosides-docusate sodium  2 tablet Oral Daily    spironolactone  25 mg Oral Daily    traZODone  150 mg Oral Nightly    sodium chloride flush  10 mL Intravenous 2 times per day    aspirin  325 mg Oral Daily    enoxaparin  40 mg Subcutaneous Daily    carvedilol  6.25 mg Oral Daily     Continuous Infusions:   PRN Meds: ALPRAZolam, docusate sodium, HYDROcodone-acetaminophen, sodium chloride flush, potassium chloride **OR** potassium alternative oral replacement **OR** potassium chloride, potassium chloride, magnesium sulfate, magnesium hydroxide, ondansetron, nitroGLYCERIN    Data:     Past Medical History:   has a past medical history of Acute hypokalemia, Anxiety, Benign prostate hyperplasia, Benign prostatic hyperplasia, CAD (coronary artery disease), CHF (congestive heart failure) (HCC), Chronic kidney disease, Chronic renal disease, stage II, Cognitive communication deficit, COPD (chronic obstructive pulmonary disease) ([facility] Coronary atherosclerosis of native coronary artery, Depression, Dysphagia, General weakness, Hearing loss, Hearing loss, Heart attack ([facility] Heart failure, unspecified [facility]), Hemiplegia and hemiparesis following cerebral infarction affecting left non-dominant side ([facility] Hemiplegia and hemiparesis following unspecified cerebrovascular disease affecting left non-dominant side ([facility] Hyperlipidemia, Hypertension, Insomnia, Repeated falls, and Unspecified sleep apnea. Social History:   reports that he has quit smoking. His smoking use included cigars. He has never used smokeless tobacco. He reports that he drinks alcohol. He reports that he does not use drugs. Family History:   Family History   Problem Relation Age of Onset    Other Mother         Hip fracture    Other Father     Cancer Brother         bone marrow    Diabetes Maternal Grandfather        Vitals:  BP (!) 148/70   Pulse 92   Temp 97.6 °F (36.4 °C) (Oral)   Resp 18   Ht 5' 6\" (1.676 m)   Wt 235 lb 3.7 oz (106.7 kg)   SpO2 95%   BMI 37.97 kg/m²   Temp (24hrs), Av.6 °F (36.4 °C), Min:97.4 °F (36.3 °C), Max:97.7 °F (36.5 °C)    No results for input(s): POCGLU in the last 72 hours. I/O (24Hr):     Intake/Output Summary (Last 24 hours) at 2019 0646  Last data filed at 2019 0534  Gross per 24 hour   Intake 450 ml   Output 825 ml   Net -375 ml       Labs:    Hematology:  Recent Labs     19  0922 19  0208 19  0535   WBC 6.9 6.8 6.9   RBC 4.45* 4.04* 4.01*   HGB 12.9* 11.7* 11.6*   HCT 39.1* 35.4* 34.7*   MCV 87.8 87.6 86.6   MCH 29.0 29.1 29.0   MCHC 33.0 33.2 33.4   RDW 14.7 14.8 14.9    204 219   MPV 9.2 9.1 9.2   DDIMER  --  0.58  --      Chemistry:  Recent Labs     19  0922  19  0208 19  0630 19  1116 19  1706 19  0535     --  140  --   --   --  138   K 4.6  --  4.5  --   --   --  4.8     --  102  --   --   --  99   CO2 25  --  26  --   --   --  27   GLUCOSE 179*  --  119*  --   --   --  111*   BUN 16  --  17  --   --   --  16   CREATININE 1.53*  --  1.41*  --   --   --  1.27*   MG  --   --   --   --   --   --  2.2 ANIONGAP 13  --  12  --   --   --  12   LABGLOM 43*  --  48*  --   --   --  54*   GFRAA 53*  --  58*  --   --   --  >60   CALCIUM 9.5  --  9.0  --   --   --  8.8   PROBNP  --   --  598*  --   --   --   --    TROPHS  --    < > 97* 138* 157* 171*  --     < > = values in this interval not displayed. Recent Labs     05/20/19  0922 05/23/19  0535   PROT 6.9 6.3*   LABALBU 3.5 3.4*   AST 20 20   ALT 27 20   ALKPHOS 62 52   BILITOT 0.36 0.32         Lab Results   Component Value Date/Time    SPECIAL  03/14/2018 09:00 PM     RM 1223 Performed at [facility] [address] [facility] [place], [address] [phone] 03/14/2018 09:00 PM     Lab Results   Component Value Date/Time    CULTURE NO SIGNIFICANT GROWTH 03/14/2018 09:00 PM    CULTURE  03/14/2018 09:00 PM     Performed at [facility] [id number] [place] [address] [phone]         Radiology:    Ct Abdomen Pelvis Wo Contrast Additional Contrast? None  Result Date: 5/21/2019  No acute findings. Multilevel degenerative changes. Nm Lung Vent/perfusion (vq)  Result Date: 5/22/2019  Low probability for pulmonary embolus. Xr Chest Portable  Result Date: 5/22/2019  1. Low lung volumes, stable.        Physical Examination:       General appearance:  alert, cooperative and no distress  Mental Status:  oriented to person, place and time and normal affect, slight droop left corner of mouth  Lungs:  clear to auscultation bilaterally, normal effort  Heart:  regular rate and rhythm, no murmur  Abdomen:  Softly obese, nontender, normal bowel sounds, no masses, hepatomegaly, splenomegaly  Extremities:  no edema, redness, tenderness in the calves, no movement left arm, left leg weaker than right  Skin:  no gross lesions, rashes, induration    Assessment:       Primary Problem  Non-ST elevation MI (NSTEMI) [facility])    Active Hospital Problems    Diagnosis Date Noted    Chest pain [R07.9] 05/22/2019    Left hemiparesis ([facility] [G81.94] 05/22/2019    First degree AV block [I44.0] 05/22/2019    Right bundle branch block [I45.10] 05/22/2019    Chronic midline low back pain without sciatica [M54.5, G89.29] 05/22/2019    Non-ST elevation MI (NSTEMI) ([facility] [I21.4] 05/22/2019    Obesity (BMI 30-39. 9) [E66.9] 05/22/2019    NSTEMI (non-ST elevated myocardial infarction) ([facility] [I21.4] 05/22/2019    Hypertension [I10]     Repeated falls [R29.6]     Elevated troponin [R74.8]     Vitamin D deficiency [E55.9] 03/02/2016    CKD (chronic kidney disease), stage III (HCC) [N18.3] 02/09/2016    Sleep apnea [G47.30]     Cerebral infarction ([facility] [I63.9] 05/07/2014    CAD (coronary artery disease) [I25.10] 03/01/2013    CHF (congestive heart failure) ([facility] [I50.9] 03/01/2013    SHAWANDA (obstructive sleep apnea) [G47.33] 03/01/2013       Plan:     1. Discharge back to skilled nursing facility  2. See discharge summary for details of plan    I have seen and examined José Miguel Alonzo and the padilla elements of all parts of the encounter have been performed by me. I agree with the assessment, plan and orders as documented by the Advanced Practice Provider. Any modifications to the exam findings and the plan of treatment is as below and the final version is my approved version of the assessment.     Additional Comments:   Patient up to chair  No chest pain  Hoping to go home  Continues to request conservative management    Heart regular rate and rhythm  Lungs reveal better airflow, rare rale  Abdomen soft bowel sounds present  Mild edema  Left hemiparesis    Creatinine down to 1.27  Troponin 171    Cardiology suggesting medical management  Patient agrees to conservative care  Code status remains DNR CC  Continue pain management  Vitamin D supplementation as needed    Discharge planning initiated  He will return to the extended care facility  The patient was instructed to follow up with their PCP, Kimberlyn Shultz MD in one week  Cardiology follow-up as scheduled    Electronically signed by Stephany Estrella DO on 5/23/2019 at 5:05 PM        VIVIENNE Cuevas - CNS  5/23/2019  6:46 AM

## 2019-05-23 NOTE — CARE COORDINATION
Notice of pt discharged received. Spoke with PT/OT regarding transportation needs. At this time due to his medical needs pt appropriate for stretcher. Faxed information to [facility] and spoke to staff; LUCY 3:30 PM. Follow up appointments arranged. KURT completed. Contacted son Elfredia Manual and provided update. Discussed with son regarding pt's unhappiness with current arrangements. Son expressed knowledge of pt's wishes but at this time due to his income pt is not able to live in an apartment with 24 hrs nursing care. Stated pt would like to have his \"old life back\" and his youth; this is not realistic. Son is doing the best he can to meet his father's needs.

## 2019-05-23 NOTE — PROGRESS NOTES
Patient discharged to [address] [place] [address] per [facility]. All belongings sent with patient. Patient had no concerns at discharge.

## 2019-05-23 NOTE — DISCHARGE SUMMARY
[facility]    Discharge Summary     Patient ID: Heriberto Villavicencio  :  1934   MRN: [id number]     ACCOUNT:  [de-identified]   Patient's PCP: Joss Restrepo MD  Admit Date: 2019   Discharge Date: 2019     Length of Stay: 1  Code Status:  DNR-CC  Admitting Physician: Ginger Lopez, DO  Discharge Physician: Tarun Conn, APRN - CNS     Active Discharge Diagnoses:     Hospital Problem Lists:  Principal Problem:    Non-ST elevation MI (NSTEMI) ([facility]  Active Problems:    SHAWANDA (obstructive sleep apnea)    CHF (congestive heart failure) ([facility]    CAD (coronary artery disease)    Cerebral infarction ([facility]    Sleep apnea    CKD (chronic kidney disease), stage III ([facility]    Vitamin D deficiency    Chest pain    Hypertension    Repeated falls    Elevated troponin    Left hemiparesis (HCC)    First degree AV block    Right bundle branch block    Chronic midline low back pain without sciatica    Obesity (BMI 30-39. 9)    NSTEMI (non-ST elevated myocardial infarction) ([facility]  Resolved Problems:    * No resolved hospital problems. *      Admission Condition:  fair     Discharged Condition: stable    Hospital Stay:     Hospital Course:      Gerardo Garcia is a 80 y.o. male who presents with Chest Pain (PAtient was transfered from [address] [place] [address] Drive for Chest Pain. EMS gave 1 SL ntg and 324mg ASA and arrivied with IV to right wrist (Inflitrated and discontinued). Currently denies chest pain. )  He was admitted through the ED May 22 for the management of chest pain. He had 5 vessel CABG in . He does not recall any recent stress testing. Pt's code is [facility]. He developed left sided dull achey chest pain May 21 at 10am while lying in bed. It was worse with position changes. He received NTG with some relief. He denied SOB but felt better with oxygen. There was no palpitations, N/V or diaphoresis.   The pain returned at 10 pm and staff at REPORTED     Platelet Estimate NOT REPORTED    Comprehensive Metabolic Panel    Collection Time: 05/20/19  9:22 AM   Result Value Ref Range    Glucose 179 (H) 70 - 99 mg/dL    BUN 16 8 - 23 mg/dL    CREATININE 1.53 (H) 0.70 - 1.20 mg/dL    Bun/Cre Ratio NOT REPORTED 9 - 20    Calcium 9.5 8.6 - 10.4 mg/dL    Sodium 139 135 - 144 mmol/L    Potassium 4.6 3.7 - 5.3 mmol/L    Chloride 101 98 - 107 mmol/L    CO2 25 20 - 31 mmol/L    Anion Gap 13 9 - 17 mmol/L    Alkaline Phosphatase 62 40 - 129 U/L    ALT 27 5 - 41 U/L    AST 20 <40 U/L    Total Bilirubin 0.36 0.3 - 1.2 mg/dL    Total Protein 6.9 6.4 - 8.3 g/dL    Alb 3.5 3.5 - 5.2 g/dL    Albumin/Globulin Ratio 1.0 1.0 - 2.5    GFR Non-African American 43 (L) >60 mL/min    GFR  53 (L) >60 mL/min    GFR Comment          GFR Staging NOT REPORTED    Urinalysis Reflex to Culture    Collection Time: 05/20/19 11:13 AM   Result Value Ref Range    Color, UA DARK YELLOW (A) YELLOW    Turbidity UA CLEAR CLEAR    Glucose, Ur NEGATIVE NEGATIVE    Bilirubin Urine NEGATIVE NEGATIVE    Ketones, Urine NEGATIVE NEGATIVE    Specific Gravity, UA 1.032 (H) 1.005 - 1.030    Urine Hgb NEGATIVE NEGATIVE    pH, UA 5.0 5.0 - 8.0    Protein, UA TRACE (A) NEGATIVE    Urobilinogen, Urine Normal Normal    Nitrite, Urine NEGATIVE NEGATIVE    Leukocyte Esterase, Urine NEGATIVE NEGATIVE    Urinalysis Comments NOT REPORTED    Microscopic Urinalysis    Collection Time: 05/20/19 11:13 AM   Result Value Ref Range    -          WBC, UA 2 TO 5 0 - 5 /HPF    RBC, UA 2 TO 5 0 - 2 /HPF    Casts UA 2 TO 5 /LPF    Casts UA HYALINE /LPF    Crystals UA NOT REPORTED None /HPF    Epithelial Cells UA 2 TO 5 0 - 5 /HPF    Renal Epithelial, Urine NOT REPORTED 0 /HPF    Bacteria, UA FEW (A) None    Mucus, UA 1+ (A) None    Trichomonas, UA NOT REPORTED None    Amorphous, UA 1+ (A) None    Other Observations UA (A) NOT REQ.      Utilizing a urinalysis as the only screening method to exclude a potential uropathogen can be unreliable in many patient populations. Rapid screening tests are less sensitive than culture and if UTI is a clinical possibility, culture should be considered despite a negative urinalysis.     Yeast, UA NOT REPORTED None   EKG 12 Lead    Collection Time: 05/22/19  1:55 AM   Result Value Ref Range    Ventricular Rate 89 BPM    Atrial Rate 89 BPM    P-R Interval 232 ms    QRS Duration 144 ms    Q-T Interval 414 ms    QTc Calculation (Bazett) 503 ms    P Axis 73 degrees    R Axis -141 degrees    T Axis 60 degrees   CBC Auto Differential    Collection Time: 05/22/19  2:08 AM   Result Value Ref Range    WBC 6.8 3.5 - 11.0 k/uL    RBC 4.04 (L) 4.5 - 5.9 m/uL    Hemoglobin 11.7 (L) 13.5 - 17.5 g/dL    Hematocrit 35.4 (L) 41 - 53 %    MCV 87.6 80 - 100 fL    MCH 29.1 26 - 34 pg    MCHC 33.2 31 - 37 g/dL    RDW 14.8 12.5 - 15.4 %    Platelets 592 540 - 838 k/uL    MPV 9.1 6.0 - 12.0 fL    NRBC Automated NOT REPORTED per 100 WBC    Differential Type NOT REPORTED     Seg Neutrophils 69 (H) 36 - 66 %    Lymphocytes 18 (L) 24 - 44 %    Monocytes 8 2 - 11 %    Eosinophils % 4 1 - 4 %    Basophils 1 0 - 2 %    Immature Granulocytes NOT REPORTED 0 %    Segs Absolute 4.70 1.8 - 7.7 k/uL    Absolute Lymph # 1.30 1.0 - 4.8 k/uL    Absolute Mono # 0.50 0.1 - 1.2 k/uL    Absolute Eos # 0.30 0.0 - 0.4 k/uL    Basophils # 0.00 0.0 - 0.2 k/uL    Absolute Immature Granulocyte NOT REPORTED 0.00 - 0.30 k/uL    WBC Morphology NOT REPORTED     RBC Morphology NOT REPORTED     Platelet Estimate NOT REPORTED    Basic Metabolic Panel w/ Reflex to MG    Collection Time: 05/22/19  2:08 AM   Result Value Ref Range    Glucose 119 (H) 70 - 99 mg/dL    BUN 17 8 - 23 mg/dL    CREATININE 1.41 (H) 0.70 - 1.20 mg/dL    Bun/Cre Ratio NOT REPORTED 9 - 20    Calcium 9.0 8.6 - 10.4 mg/dL    Sodium 140 135 - 144 mmol/L    Potassium 4.5 3.7 - 5.3 mmol/L    Chloride 102 98 - 107 mmol/L    CO2 26 20 - 31 mmol/L    Anion Gap 12 9 - 17 mmol/L GFR Non- 48 (L) >60 mL/min    GFR  58 (L) >60 mL/min    GFR Comment          GFR Staging NOT REPORTED    Troponin    Collection Time: 05/22/19  2:08 AM   Result Value Ref Range    Troponin, High Sensitivity 97 (HH) 0 - 22 ng/L    Troponin T NOT REPORTED <0.03 ng/mL    Troponin Interp NOT REPORTED    D-Dimer, Quantitative    Collection Time: 05/22/19  2:08 AM   Result Value Ref Range    D-Dimer, Quant 0.58 mg/L FEU   Brain Natriuretic Peptide    Collection Time: 05/22/19  2:08 AM   Result Value Ref Range    Pro- (H) <300 pg/mL    BNP Interpretation Pro-BNP Reference Range:    Troponin    Collection Time: 05/22/19  6:30 AM   Result Value Ref Range    Troponin, High Sensitivity 138 (HH) 0 - 22 ng/L    Troponin T NOT REPORTED <0.03 ng/mL    Troponin Interp NOT REPORTED    Troponin    Collection Time: 05/22/19 11:16 AM   Result Value Ref Range    Troponin, High Sensitivity 157 (HH) 0 - 22 ng/L    Troponin T NOT REPORTED <0.03 ng/mL    Troponin Interp NOT REPORTED    Troponin    Collection Time: 05/22/19  5:06 PM   Result Value Ref Range    Troponin, High Sensitivity 171 (HH) 0 - 22 ng/L    Troponin T NOT REPORTED <0.03 ng/mL    Troponin Interp NOT REPORTED    Comprehensive Metabolic Panel w/ Reflex to MG    Collection Time: 05/23/19  5:35 AM   Result Value Ref Range    Glucose 111 (H) 70 - 99 mg/dL    BUN 16 8 - 23 mg/dL    CREATININE 1.27 (H) 0.70 - 1.20 mg/dL    Bun/Cre Ratio NOT REPORTED 9 - 20    Calcium 8.8 8.6 - 10.4 mg/dL    Sodium 138 135 - 144 mmol/L    Potassium 4.8 3.7 - 5.3 mmol/L    Chloride 99 98 - 107 mmol/L    CO2 27 20 - 31 mmol/L    Anion Gap 12 9 - 17 mmol/L    Alkaline Phosphatase 52 40 - 129 U/L    ALT 20 5 - 41 U/L    AST 20 <40 U/L    Total Bilirubin 0.32 0.3 - 1.2 mg/dL    Total Protein 6.3 (L) 6.4 - 8.3 g/dL    Alb 3.4 (L) 3.5 - 5.2 g/dL    Albumin/Globulin Ratio 1.2 1.0 - 2.5    GFR Non- 54 (L) >60 mL/min    GFR African American >60 >60 mL/min    GFR Comment          GFR Staging NOT REPORTED    Magnesium    Collection Time: 05/23/19  5:35 AM   Result Value Ref Range    Magnesium 2.2 1.6 - 2.6 mg/dL   Lipid panel - fasting    Collection Time: 05/23/19  5:35 AM   Result Value Ref Range    Cholesterol 177 <200 mg/dL    HDL 50 >40 mg/dL    LDL Cholesterol 101 0 - 130 mg/dL    Chol/HDL Ratio 3.5 <5    Triglycerides 129 <150 mg/dL    VLDL NOT REPORTED 1 - 30 mg/dL   CBC    Collection Time: 05/23/19  5:35 AM   Result Value Ref Range    WBC 6.9 3.5 - 11.0 k/uL    RBC 4.01 (L) 4.5 - 5.9 m/uL    Hemoglobin 11.6 (L) 13.5 - 17.5 g/dL    Hematocrit 34.7 (L) 41 - 53 %    MCV 86.6 80 - 100 fL    MCH 29.0 26 - 34 pg    MCHC 33.4 31 - 37 g/dL    RDW 14.9 12.5 - 15.4 %    Platelets 075 423 - 207 k/uL    MPV 9.2 6.0 - 12.0 fL    NRBC Automated NOT REPORTED per 100 WBC     Radiology:    Ct Abdomen Pelvis Wo Contrast Additional Contrast? None  Result Date: 5/21/2019  No acute findings. Multilevel degenerative changes. Nm Lung Vent/perfusion (vq)  Result Date: 5/22/2019  Low probability for pulmonary embolus. Xr Chest Portable  Result Date: 5/22/2019  1. Low lung volumes, stable. Consultations:    Consults:     Final Specialist Recommendations/Findings:   IP CONSULT TO CARDIOLOGY      The patient was seen and examined on day of discharge and this discharge summary is in conjunction with any daily progress note from day of discharge. Discharge plan:     Disposition: Skilled Facility    Physician Follow Up:     Damon Stevens MD  [phone] [facility]  [phone]    On 5/30/2019  Hospital follow up visit at  2:30 PM with Bairon Garcia NP.  If you are not able to make this appointment please contact the office to [facility], [facility] [address]  [phone]    On 7/17/2019  Appointment at 1:15 PM        Requiring Further Evaluation/Follow Up POST HOSPITALIZATION/Incidental Findings:     Diet: cardiac diet    Activity: As tolerated    Instructions to Patient:     Discharge Medications:      Medication List      START taking these medications    aspirin 325 MG EC tablet  Take 1 tablet by mouth daily  Start taking on:  5/24/2019  Replaces:  aspirin 81 MG tablet     nitroGLYCERIN 0.4 MG SL tablet  Commonly known as:  NITROSTAT  up to max of 3 total doses. If no relief after 1 dose, call 911. CHANGE how you take these medications    bumetanide 1 MG tablet  Commonly known as:  BUMEX  Take 1 tablet by mouth daily  Start taking on:  5/24/2019  What changed:    · medication strength  · how much to take     carvedilol 6.25 MG tablet  Commonly known as:  COREG  Take 1 tablet by mouth daily  Start taking on:  5/24/2019  What changed:    · medication strength  · how much to take  · when to take this     traZODone 50 MG tablet  Commonly known as:  DESYREL  Take 1 tablet by mouth nightly  What changed:  how much to take        CONTINUE taking these medications    acetaminophen 325 MG tablet  Commonly known as:  TYLENOL     albuterol sulfate  (90 Base) MCG/ACT inhaler  Inhale 2 puffs into the lungs every 6 hours as needed for Wheezing     ALPRAZolam 0.5 MG tablet  Commonly known as:  XANAX     atorvastatin 10 MG tablet  Commonly known as:  LIPITOR     * bisacodyl 5 MG EC tablet  Commonly known as:  DULCOLAX     * bisacodyl 10 MG suppository  Commonly known as:  DULCOLAX     clopidogrel 75 MG tablet  Commonly known as:  PLAVIX  Take 1 tablet by mouth daily     HYDROcodone-acetaminophen 5-325 MG per tablet  Commonly known as:  NORCO  Take 1 tablet by mouth every 6 hours as needed for Pain for up to 3 days.      predniSONE 20 MG tablet  Commonly known as:  DELTASONE     senna-docusate 8.6-50 MG per tablet  Commonly known as:  PERICOLACE     spironolactone 25 MG tablet  Commonly known as:  ALDACTONE  Take 1 tablet by mouth daily  Start taking on:  5/24/2019 SYMBICORT 160-4.5 MCG/ACT Aero  Generic drug:  budesonide-formoterol         * This list has 2 medication(s) that are the same as other medications prescribed for you. Read the directions carefully, and ask your doctor or other care provider to review them with you. STOP taking these medications    aspirin 81 MG tablet  Replaced by:  aspirin 325 MG EC tablet     docusate sodium 100 MG capsule  Commonly known as:  COLACE           Where to Get Your Medications      You can get these medications from any pharmacy    Bring a paper prescription for each of these medications  · aspirin 325 MG EC tablet  · bumetanide 1 MG tablet  · carvedilol 6.25 MG tablet  · nitroGLYCERIN 0.4 MG SL tablet  · spironolactone 25 MG tablet         Time Spent on discharge is  20 mins in patient examination, evaluation, counseling as well as medication reconciliation, prescriptions for required medications, discharge plan and follow up. Electronically signed by   VIVIENNE Gonsales  5/23/2019  3:43 PM      Thank you Dr. Torey Heath MD for the opportunity to be involved in this patient's care.

## 2019-05-23 NOTE — DISCHARGE INSTR - DIET

## 2019-05-23 NOTE — PLAN OF CARE
Problem: Falls - Risk of:  Goal: Will remain free from falls  Description  Will remain free from falls  Outcome: Ongoing   Pt remains free from falls. Pt bed locked in the lowest position, non skid socks are on, and fall sign posted. Room remains free from clutter and adequate light provided. Will continue to monitor. Problem: Pain:  Goal: Pain level will decrease  Description  Pain level will decrease  Outcome: Ongoing   Pain assessed using 0-10 pain rating scale. Pt medicated with PRN pain meds and is satisfied with result. Will continue to monitor.

## 2019-05-23 NOTE — DISCHARGE INSTR - COC
Depression F32.9    Edema R60.9    Vitamin D deficiency E55.9    Recurrent major depressive disorder, in partial remission ([facility]) F33.41    Chest pain R07.9    Hypertension I10    Repeated falls R29.6    Elevated troponin R74.8    Left hemiparesis ([facility]) G81.94    First degree AV block I44.0    Right bundle branch block I45.10    Chronic midline low back pain without sciatica M54.5, G89.29    Non-ST elevation MI (NSTEMI) ([facility]) I21.4    Obesity (BMI 30-39. 9) E66.9    NSTEMI (non-ST elevated myocardial infarction) ([facility] I21.4       Isolation/Infection:   Isolation          Contact        Patient Infection Status     Infection Encounter Level? Onset Date Added Added By Resolved Resolved By Review Date    MDRO (multi-drug resistant organism) No  01/28/18 Talya Corona RN       Proteus - Urine 1/2018          Nurse Assessment:  Last Vital Signs: BP (!) 109/58   Pulse 95   Temp 98.3 °F (36.8 °C) (Oral)   Resp 18   Ht 5' 6\" (1.676 m)   Wt 235 lb 3.7 oz (106.7 kg)   SpO2 95%   BMI 37.97 kg/m²     Last documented pain score (0-10 scale): Pain Level: 0  Last Weight:   Wt Readings from Last 1 Encounters:   05/23/19 235 lb 3.7 oz (106.7 kg)     Mental Status:  oriented    IV Access:  - None    Nursing Mobility/ADLs:  Walking   Assisted  Transfer  Assisted  Bathing  Assisted  Dressing  Assisted  Toileting  Assisted  Feeding  Assisted  Med Admin  Assisted  Med Delivery   prefers mixed with applesauce    Wound Care Documentation and Therapy:  Wound 05/22/19 Buttocks reddened blanchable area on buttocks with small open area  (Active)   Wound Pressure Stage  2 5/22/2019  5:15 AM   Dressing Status Clean;Dry; Intact 5/23/2019  8:00 AM   Dressing Changed Changed/New 5/22/2019  9:00 AM   Dressing/Treatment Foam 5/23/2019  8:00 AM   Dressing Change Due 05/25/19 5/23/2019  8:00 AM   Wound Assessment Clean;Dry; Intact; Red 5/23/2019  8:00 AM   Drainage Amount None 5/23/2019  8:00 AM   Ludmila-wound Assessment Pink 5/23/2019 8:00 AM   Number of days: 1        Elimination:  Continence:   · Bowel: Yes  · Bladder: Yes  Urinary Catheter: None   Colostomy/Ileostomy/Ileal Conduit: No       Date of Last BM: 2019    Intake/Output Summary (Last 24 hours) at 2019 1259  Last data filed at 2019 0534  Gross per 24 hour   Intake --   Output 375 ml   Net -375 ml     I/O last 3 completed shifts: In: 12 [P.O.:450]  Out: 825 [Urine:825]    Safety Concerns: At Risk for Falls    Impairments/Disabilities:      Paralysis - due to previous stroke    Nutrition Therapy:  Current Nutrition Therapy:   - Oral Diet:  General    Routes of Feeding: Oral  Liquids: No Restrictions  Daily Fluid Restriction: no  Last Modified Barium Swallow with Video (Video Swallowing Test): not done    Treatments at the Time of Hospital Discharge:   Respiratory Treatments: ***  Oxygen Therapy:  is not on home oxygen therapy. Ventilator:    - No ventilator support    Rehab Therapies: Physical Therapy and Occupational Therapy  Weight Bearing Status/Restrictions: [facility] Weight Bearin}  Other Medical Equipment (for information only, NOT a DME order):  wheelchair  Other Treatments: ***    Patient's personal belongings (please select all that are sent with patient):  None    RN SIGNATURE:  Electronically signed by Micheline Mcdaniel RN on 19 at 1:36 PM    CASE MANAGEMENT/SOCIAL WORK SECTION    Inpatient Status Date: 19    Readmission Risk Assessment Score:  Readmission Risk              Risk of Unplanned Readmission:        17           Discharging to Facility/ Agency   · Name: [address] [place] [address]  · Address: [address] [place] [address].  [facility].  · Phone: [phone]  · Fax: [phone]    Dialysis Facility (if applicable)   · Name: N/A  · Address:  · Dialysis Schedule:  · Phone:  · Fax:    / signature: Electronically signed by KIRAN Cook on 19 at 1:01 PM    PHYSICIAN SECTION    Prognosis: Good    Condition at Discharge: Stable    Rehab Potential (if transferring to Rehab): Fair    Recommended Labs or Other Treatments After Discharge:     Physician Certification: I certify the above information and transfer of Paige Banuelos  is necessary for the continuing treatment of the diagnosis listed and that he requires [facility] for greater 30 days.      Update Admission H&P: No change in H&P    PHYSICIAN SIGNATURE:  Dr Romel Betancourt

## 2019-05-23 NOTE — PROGRESS NOTES
Occupational Therapy  Facility/Department: [facility] PROGRESSIVE CARE  Daily Treatment Note  NAME: Craig Cerda  : 1934  MRN: [id number]    Date of Service: 2019    Discharge Recommendations:            Assessment   Performance deficits / Impairments: Decreased functional mobility ; Decreased ADL status; Decreased safe awareness;Decreased endurance;Decreased balance  Assessment: Patient required use of leo stedy this date for toilet transfer due to urgency of toileting needs. Mod A x2  to  CGA for sit to stand   Patient Education: OT Services/OT POC, Safety Awareness/Fall Prevention, Safety with Transfers/Use of Albino Muster, ADL Techniques  REQUIRES OT FOLLOW UP: Yes  Activity Tolerance  Activity Tolerance: Patient Tolerated treatment well  Activity Tolerance: limited by discomfort due to not being able to move bowels   Safety Devices  Safety Devices in place: Yes  Type of devices: Call light within reach;Nurse notified; Left in chair;Chair alarm in place         Patient Diagnosis(es): The primary encounter diagnosis was Chest pain, unspecified type. A diagnosis of Elevated troponin was also pertinent to this visit.       has a past medical history of Acute hypokalemia, Anxiety, Benign prostate hyperplasia, Benign prostatic hyperplasia, CAD (coronary artery disease), CHF (congestive heart failure) ([address] Chronic kidney disease, Chronic renal disease, stage II, Cognitive communication deficit, COPD (chronic obstructive pulmonary disease) ([address] Coronary atherosclerosis of native coronary artery, Depression, Dysphagia, General weakness, Hearing loss, Hearing loss, Heart attack ([address] Heart failure, unspecified ([address] Hemiplegia and hemiparesis following cerebral infarction affecting left non-dominant side ([address] Hemiplegia and hemiparesis following unspecified cerebrovascular disease affecting left non-dominant side ([address] Hyperlipidemia, Hypertension, Insomnia, Repeated falls, and Unspecified sleep apnea.   has a past surgical history that includes Coronary artery bypass graft (2006) and Cataract removal.    Restrictions  Restrictions/Precautions  Restrictions/Precautions: Fall Risk, Isolation, Contact Precautions, Up as Tolerated  Required Braces or Orthoses?: No  Position Activity Restriction  Other position/activity restrictions: L sided deficits from prior CVA  Subjective   General  Chart Reviewed: No  Patient assessed for rehabilitation services?: Yes  Family / Caregiver Present: No  General Comment  Comments: RN ok'd therapy treatment this AM. Patient agreeable to participate, requesting to try for a BM, not successful on toilet, requiring suppository by RN. Vital Signs  Patient Currently in Pain: Denies   Orientation  Orientation  Overall Orientation Status: Within Functional Limits  Objective    ADL  Toileting: Maximum assistance; Increased time to complete  Additional Comments: Patient requesting to use toilet to attempt to have a BM. Brock Valentin used for transfer to toilet. Patient required mod A x2 for initial sit to stand from recliner chair in [address] [place] [address]. Patient required increased time for BM attempt then requested a suppository. RN administered suppository while patient in standing, and patient requested to sit back down on toilet. Patient then agreeable to get off toilet, brief applied in standing and patient transferred back into [facility]. Balance  Sitting Balance: Stand by assistance(seated on toilet, supported by [address] [place] [address] /  on both sides)  Standing Balance: Moderate assistance(CGA to mod A for balance standing in SS. likely because of discomfort due to BM issues)  Bed mobility  Comment: in recliner at beginning and end of session   Transfers  Sit to stand:  Moderate assistance;2 Person assistance(Mod A x2 from recliner, Min-CGA from toilet, )  Transfer Comments: Performed in [facility], varying assist required due to surface differences, patient declining assistance from toilet for sit to stand stating \"I can do it myself!  I don't need your help\" CGA at the least                        Cognition  Overall Cognitive Status: [address]  Times per week: 5-6x/wk  Current Treatment Recommendations: Balance Training, Endurance Training, Equipment Evaluation, Education, & procurement, Patient/Caregiver Education & Training, Safety Education & Training, Self-Care / ADL(Functional Transfer Training)    Goals  Short term goals  Time Frame for Short term goals: 14 visits  Short term goal 1: perform functional transfers with mod A using least restrictive AD for increased participation in ADLs  Short term goal 2: perform grooming tasks with setup  Short term goal 3: perform UB ADL tasks with min A using adaptive equipment/durable medical equipment as needed  Short term goal 4: demo 2+ minutes static standing tolerance for increased independence with functional transfers  Short term goal 5: perform toileting tasks with mod A using adaptive equipment/durable medical equipment as needed       Therapy Time   Individual Concurrent Group Co-treatment   Time In 0954         Time Out 1039         Minutes 45         Timed Code Treatment Minutes: 1420 Jessie Cuellar

## 2019-05-23 NOTE — PLAN OF CARE
Siderails up x 2  Hourly rounding  Call light in reach  Instructed to call for assist before attempting out of bed. Remains free from falls and accidental injury at this time   Floor free from obstacles  Bed is locked and in lowest position  Adequate lighting provided  Bed alarm on, as well as chair alarm. Red Falling star and Stay with Me signs posted. Respiratory status has improved. Patient denies difficulty with breathing. Pain level assessment complete. Patient educated on pain scale and control interventions  PRN pain medication given per patient request  Patient instructed to call out with new onset of pain or unrelieved pain. Patient denies chest pain currently. Patient denies any cardiac issues currently.

## 2019-05-23 NOTE — CARE COORDINATION
Discharge [facility] Case Management Department  Written by: KIRAN Castaneda    Patient Name: Adriano Block  Attending Provider: Alina Ruth DO  Admit Date: 2019  2:01 AM  MRN: [id number]  Account: [de-identified]                     : 1934  Discharge Date: 19      Disposition: long term care facility- /[facility] [place]

## 2019-05-23 NOTE — CARE COORDINATION
Case Management Initial Discharge Plan  Cecy Gomez,             Met with:patient to discuss discharge plans. Information verified: address, contacts, phone number, , insurance Yes  PCP: Nicolasa Tran MD  Date of last visit: 10/26/18    Insurance Provider: Yolanda Rodriguez    Discharge Planning    Living Arrangements:  Alone, Other (Comment)(in an ECF)   Support Systems:  Family Members, ECF/Assisted Living    Home has 1 stories  no stairs to climb to get into front door, n/a stairs to climb to reach second floor  Location of bedroom/bathroom in home main level    Patient able to perform ADL's:Assisted    Current Services (outpatient & in home) ECF  DME equipment: wheel chair  DME provider: unknown    Pharmacy: facility   Potential Assistance Purchasing Medications:  No  Does patient want to participate in local refill/ meds to beds program?  No    Potential Assistance Needed:  Abril Dietrich 85    Patient agreeable to home care: No  [place] of choice provided:  n/a    Prior SNF/Rehab Placement and Facility: [address] [place] [address] Avenue to SNF/Rehab: Yes  [place] of choice provided: yes   Evaluation: yes    Expected Discharge date:  19  Patient expects to be discharged to:  [address] [place] [address]  Follow Up Appointment: Lexus Pope Day/ Time: Friday AM    Transportation provider: facility or family  Transportation arrangements needed for discharge: Yes    Readmission Risk              Risk of Unplanned Readmission:        17             Does patient have a readmission risk score greater than 14?: Yes  If yes, follow-up appointment must be made within 7 days of discharge.      Discharge Plan: Return to [address] [place] [address]          Electronically signed by KIRAN Rose on 19 at 9:09 AM

## 2019-05-30 ENCOUNTER — TELEPHONE (OUTPATIENT)
Dept: PRIMARY CARE CLINIC | Age: 84
End: 2019-05-30

## 2019-06-24 PROBLEM — E78.5 HYPERLIPIDEMIA: Status: ACTIVE | Noted: 2019-06-24

## 2019-06-24 PROBLEM — Z95.5 S/P DRUG ELUTING CORONARY STENT PLACEMENT: Status: ACTIVE | Noted: 2019-06-24

## 2019-06-24 PROBLEM — I27.20 PULMONARY HYPERTENSION (HCC): Status: ACTIVE | Noted: 2019-06-24

## 2019-06-24 PROBLEM — I50.22 CHRONIC SYSTOLIC (CONGESTIVE) HEART FAILURE (HCC): Status: ACTIVE | Noted: 2019-06-24

## 2019-06-24 PROBLEM — I25.2 H/O NON-ST ELEVATION MYOCARDIAL INFARCTION (NSTEMI): Status: ACTIVE | Noted: 2019-06-24

## 2019-06-24 PROBLEM — I34.0 MODERATE MITRAL REGURGITATION: Status: ACTIVE | Noted: 2019-06-24

## 2019-06-24 PROBLEM — Z98.890 S/P CARDIAC CATH: Status: ACTIVE | Noted: 2019-06-24

## 2019-06-24 PROBLEM — Z79.02 ENCOUNTER FOR CURRENT LONG TERM USE OF ANTIPLATELET DRUG: Status: ACTIVE | Noted: 2019-06-24

## 2019-06-24 PROBLEM — I25.5 ISCHEMIC CARDIOMYOPATHY: Status: ACTIVE | Noted: 2019-06-24

## 2025-03-21 NOTE — PROGRESS NOTES
Orientation  Orientation  Overall Orientation Status: Within Functional Limits  Cognition      Objective   Bed mobility  Comment: Pt up in recliner and returned to recliner after session. Transfers  Sit to Stand: Moderate Assistance;2 Person Assistance;Minimal Assistance  Stand to sit: Minimal Assistance; Moderate Assistance;2 Person Assistance  Comment: Pt transferred with Rosi Marcimag with two assist from recliner to toilet and back. Pt sat on toilet for some time c/o constipation. Nursing notified and placed suppository while pt on toilet. Brief put on pt and pt returned to recliner via Rosi Bucy. Ambulation  Ambulation?: No     Balance  Posture: Fair  Sitting - Static: Good  Sitting - Dynamic: Fair;+  Standing - Static: Poor                           Assessment   Body structures, Functions, Activity limitations: Decreased functional mobility ; Decreased balance;Decreased safe awareness;Decreased endurance;Decreased strength;Decreased ROM  Treatment Diagnosis: impaired mobilty related to chest pain. Prognosis: Fair  Patient Education: POC, safe transfers  REQUIRES PT FOLLOW UP: Yes  Activity Tolerance  Activity Tolerance: Patient limited by endurance; Patient limited by pain;Treatment limited secondary to medical complications (free text)     G-Code     OutComes Score                                                  AM-PAC Score             Goals  Short term goals  Time Frame for Short term goals: 14 visits  Short term goal 1: Moderate assist x1 assist for bed mobility  Short term goal 2: sit or stand pivot transfer with 1 moderate assist bed to chair  Short term goal 3: Stand with leo stedy or 2 assist with equal WB through LEs x2-3 minutes and 1 mod assist  Short term goal 4: Increase L ankle ROM to ~ 2-3 deg Dorsiflexion PROM  Patient Goals   Patient goals : \" get back to the way I was a few weeks ago. \"    Plan    Plan  Times per week: 4-5  Times per day: Daily  Specific instructions for Next Treatment: standing balance in leo pulido.   Current Treatment Recommendations: Endurance Training, Strengthening, Transfer Training, Balance Training, Functional Mobility Training  Safety Devices  Type of devices: Left in chair, Chair alarm in place, Call light within reach, Nurse notified, Patient at risk for falls, Gait belt     Therapy Time   Individual Concurrent Group Co-treatment   Time In 0954         Time Out 1039         Minutes 45         Timed Code Treatment Minutes: Flaco Sherman 149, PT Universal Safety Interventions